# Patient Record
Sex: MALE | Race: WHITE | NOT HISPANIC OR LATINO | Employment: OTHER | URBAN - METROPOLITAN AREA
[De-identification: names, ages, dates, MRNs, and addresses within clinical notes are randomized per-mention and may not be internally consistent; named-entity substitution may affect disease eponyms.]

---

## 2017-02-17 ENCOUNTER — RECORDS - HEALTHEAST (OUTPATIENT)
Dept: LAB | Facility: CLINIC | Age: 62
End: 2017-02-17

## 2017-02-17 LAB
CHOLEST SERPL-MCNC: 269 MG/DL
FASTING STATUS PATIENT QL REPORTED: ABNORMAL
HDLC SERPL-MCNC: 35 MG/DL
LDLC SERPL CALC-MCNC: 162 MG/DL
TRIGL SERPL-MCNC: 359 MG/DL

## 2017-09-01 ENCOUNTER — RECORDS - HEALTHEAST (OUTPATIENT)
Dept: LAB | Facility: CLINIC | Age: 62
End: 2017-09-01

## 2017-09-01 LAB
CHOLEST SERPL-MCNC: 197 MG/DL
FASTING STATUS PATIENT QL REPORTED: ABNORMAL
HDLC SERPL-MCNC: 37 MG/DL
LDLC SERPL CALC-MCNC: 132 MG/DL
TRIGL SERPL-MCNC: 142 MG/DL

## 2018-02-27 ENCOUNTER — RECORDS - HEALTHEAST (OUTPATIENT)
Dept: LAB | Facility: CLINIC | Age: 63
End: 2018-02-27

## 2018-02-27 LAB
ANION GAP SERPL CALCULATED.3IONS-SCNC: 8 MMOL/L (ref 5–18)
BUN SERPL-MCNC: 13 MG/DL (ref 8–22)
CALCIUM SERPL-MCNC: 9.7 MG/DL (ref 8.5–10.5)
CHLORIDE BLD-SCNC: 102 MMOL/L (ref 98–107)
CHOLEST SERPL-MCNC: 196 MG/DL
CO2 SERPL-SCNC: 29 MMOL/L (ref 22–31)
CREAT SERPL-MCNC: 1.31 MG/DL (ref 0.7–1.3)
FASTING STATUS PATIENT QL REPORTED: ABNORMAL
GFR SERPL CREATININE-BSD FRML MDRD: 56 ML/MIN/1.73M2
GLUCOSE BLD-MCNC: 92 MG/DL (ref 70–125)
HDLC SERPL-MCNC: 38 MG/DL
LDLC SERPL CALC-MCNC: 119 MG/DL
POTASSIUM BLD-SCNC: 4.5 MMOL/L (ref 3.5–5)
PSA SERPL-MCNC: 0.6 NG/ML (ref 0–4.5)
SODIUM SERPL-SCNC: 139 MMOL/L (ref 136–145)
TRIGL SERPL-MCNC: 194 MG/DL

## 2018-10-18 ENCOUNTER — RECORDS - HEALTHEAST (OUTPATIENT)
Dept: LAB | Facility: CLINIC | Age: 63
End: 2018-10-18

## 2018-10-18 LAB
ANION GAP SERPL CALCULATED.3IONS-SCNC: 12 MMOL/L (ref 5–18)
BUN SERPL-MCNC: 14 MG/DL (ref 8–22)
CALCIUM SERPL-MCNC: 10.3 MG/DL (ref 8.5–10.5)
CHLORIDE BLD-SCNC: 97 MMOL/L (ref 98–107)
CO2 SERPL-SCNC: 28 MMOL/L (ref 22–31)
CREAT SERPL-MCNC: 1.23 MG/DL (ref 0.7–1.3)
GFR SERPL CREATININE-BSD FRML MDRD: 60 ML/MIN/1.73M2
GLUCOSE BLD-MCNC: 175 MG/DL (ref 70–125)
POTASSIUM BLD-SCNC: 3.7 MMOL/L (ref 3.5–5)
SODIUM SERPL-SCNC: 137 MMOL/L (ref 136–145)

## 2019-05-21 ENCOUNTER — RECORDS - HEALTHEAST (OUTPATIENT)
Dept: LAB | Facility: CLINIC | Age: 64
End: 2019-05-21

## 2019-05-21 LAB
ANION GAP SERPL CALCULATED.3IONS-SCNC: 13 MMOL/L (ref 5–18)
BUN SERPL-MCNC: 16 MG/DL (ref 8–22)
CALCIUM SERPL-MCNC: 10.3 MG/DL (ref 8.5–10.5)
CHLORIDE BLD-SCNC: 97 MMOL/L (ref 98–107)
CHOLEST SERPL-MCNC: 218 MG/DL
CO2 SERPL-SCNC: 28 MMOL/L (ref 22–31)
CREAT SERPL-MCNC: 1.34 MG/DL (ref 0.7–1.3)
FASTING STATUS PATIENT QL REPORTED: ABNORMAL
GFR SERPL CREATININE-BSD FRML MDRD: 54 ML/MIN/1.73M2
GLUCOSE BLD-MCNC: 101 MG/DL (ref 70–125)
HDLC SERPL-MCNC: 37 MG/DL
LDLC SERPL CALC-MCNC: 127 MG/DL
POTASSIUM BLD-SCNC: 3.7 MMOL/L (ref 3.5–5)
SODIUM SERPL-SCNC: 138 MMOL/L (ref 136–145)
TRIGL SERPL-MCNC: 269 MG/DL

## 2019-05-22 LAB — HBA1C MFR BLD: 6.1 % (ref 4.2–6.1)

## 2019-11-01 ENCOUNTER — RECORDS - HEALTHEAST (OUTPATIENT)
Dept: LAB | Facility: CLINIC | Age: 64
End: 2019-11-01

## 2019-11-01 LAB
ANION GAP SERPL CALCULATED.3IONS-SCNC: 13 MMOL/L (ref 5–18)
BUN SERPL-MCNC: 13 MG/DL (ref 8–22)
CALCIUM SERPL-MCNC: 10.3 MG/DL (ref 8.5–10.5)
CHLORIDE BLD-SCNC: 97 MMOL/L (ref 98–107)
CO2 SERPL-SCNC: 26 MMOL/L (ref 22–31)
CREAT SERPL-MCNC: 1.25 MG/DL (ref 0.7–1.3)
GFR SERPL CREATININE-BSD FRML MDRD: 59 ML/MIN/1.73M2
GLUCOSE BLD-MCNC: 100 MG/DL (ref 70–125)
POTASSIUM BLD-SCNC: 3.9 MMOL/L (ref 3.5–5)
SODIUM SERPL-SCNC: 136 MMOL/L (ref 136–145)

## 2020-09-17 ENCOUNTER — RECORDS - HEALTHEAST (OUTPATIENT)
Dept: LAB | Facility: CLINIC | Age: 65
End: 2020-09-17

## 2020-09-17 LAB
ANION GAP SERPL CALCULATED.3IONS-SCNC: 9 MMOL/L (ref 5–18)
BUN SERPL-MCNC: 12 MG/DL (ref 8–22)
CALCIUM SERPL-MCNC: 9.2 MG/DL (ref 8.5–10.5)
CHLORIDE BLD-SCNC: 104 MMOL/L (ref 98–107)
CHOLEST SERPL-MCNC: 232 MG/DL
CO2 SERPL-SCNC: 23 MMOL/L (ref 22–31)
CREAT SERPL-MCNC: 1.11 MG/DL (ref 0.7–1.3)
FASTING STATUS PATIENT QL REPORTED: ABNORMAL
GFR SERPL CREATININE-BSD FRML MDRD: >60 ML/MIN/1.73M2
GLUCOSE BLD-MCNC: 116 MG/DL (ref 70–125)
HDLC SERPL-MCNC: 40 MG/DL
LDLC SERPL CALC-MCNC: 161 MG/DL
POTASSIUM BLD-SCNC: 4 MMOL/L (ref 3.5–5)
PSA SERPL-MCNC: 0.5 NG/ML (ref 0–4.5)
SODIUM SERPL-SCNC: 136 MMOL/L (ref 136–145)
TRIGL SERPL-MCNC: 153 MG/DL

## 2021-04-15 ENCOUNTER — AMBULATORY - HEALTHEAST (OUTPATIENT)
Dept: SCHEDULING | Facility: CLINIC | Age: 66
End: 2021-04-15

## 2021-04-15 DIAGNOSIS — I10 HTN (HYPERTENSION): ICD-10-CM

## 2021-04-15 DIAGNOSIS — E66.01 MORBID OBESITY (H): ICD-10-CM

## 2021-04-26 ENCOUNTER — RECORDS - HEALTHEAST (OUTPATIENT)
Dept: LAB | Facility: CLINIC | Age: 66
End: 2021-04-26

## 2021-04-26 LAB
ANION GAP SERPL CALCULATED.3IONS-SCNC: 9 MMOL/L (ref 5–18)
BUN SERPL-MCNC: 23 MG/DL (ref 8–22)
CALCIUM SERPL-MCNC: 9.6 MG/DL (ref 8.5–10.5)
CHLORIDE BLD-SCNC: 100 MMOL/L (ref 98–107)
CO2 SERPL-SCNC: 32 MMOL/L (ref 22–31)
CREAT SERPL-MCNC: 1.57 MG/DL (ref 0.7–1.3)
GFR SERPL CREATININE-BSD FRML MDRD: 45 ML/MIN/1.73M2
GLUCOSE BLD-MCNC: 116 MG/DL (ref 70–125)
POTASSIUM BLD-SCNC: 4.2 MMOL/L (ref 3.5–5)
PSA SERPL-MCNC: 5.7 NG/ML (ref 0–4.5)
SODIUM SERPL-SCNC: 141 MMOL/L (ref 136–145)

## 2021-04-28 LAB — BACTERIA SPEC CULT: ABNORMAL

## 2021-05-20 ENCOUNTER — RECORDS - HEALTHEAST (OUTPATIENT)
Dept: LAB | Facility: CLINIC | Age: 66
End: 2021-05-20

## 2021-05-20 LAB
ALBUMIN SERPL-MCNC: 4.6 G/DL (ref 3.5–5)
ALP SERPL-CCNC: 79 U/L (ref 45–120)
ALT SERPL W P-5'-P-CCNC: 33 U/L (ref 0–45)
ANION GAP SERPL CALCULATED.3IONS-SCNC: 11 MMOL/L (ref 5–18)
AST SERPL W P-5'-P-CCNC: 40 U/L (ref 0–40)
BILIRUB SERPL-MCNC: 0.6 MG/DL (ref 0–1)
BUN SERPL-MCNC: 24 MG/DL (ref 8–22)
CALCIUM SERPL-MCNC: 10 MG/DL (ref 8.5–10.5)
CHLORIDE BLD-SCNC: 97 MMOL/L (ref 98–107)
CO2 SERPL-SCNC: 31 MMOL/L (ref 22–31)
CREAT SERPL-MCNC: 1.72 MG/DL (ref 0.7–1.3)
GFR SERPL CREATININE-BSD FRML MDRD: 40 ML/MIN/1.73M2
GLUCOSE BLD-MCNC: 101 MG/DL (ref 70–125)
POTASSIUM BLD-SCNC: 4.8 MMOL/L (ref 3.5–5)
PROT SERPL-MCNC: 7.3 G/DL (ref 6–8)
SODIUM SERPL-SCNC: 139 MMOL/L (ref 136–145)

## 2021-05-22 ENCOUNTER — RECORDS - HEALTHEAST (OUTPATIENT)
Dept: LAB | Facility: CLINIC | Age: 66
End: 2021-05-22

## 2021-05-22 LAB
SARS-COV-2 PCR COMMENT: NORMAL
SARS-COV-2 RNA SPEC QL NAA+PROBE: NEGATIVE
SARS-COV-2 VIRUS SPECIMEN SOURCE: NORMAL

## 2021-06-16 ENCOUNTER — RECORDS - HEALTHEAST (OUTPATIENT)
Dept: LAB | Facility: CLINIC | Age: 66
End: 2021-06-16

## 2021-06-16 LAB
ANION GAP SERPL CALCULATED.3IONS-SCNC: 12 MMOL/L (ref 5–18)
BUN SERPL-MCNC: 21 MG/DL (ref 8–22)
CALCIUM SERPL-MCNC: 9.8 MG/DL (ref 8.5–10.5)
CHLORIDE BLD-SCNC: 94 MMOL/L (ref 98–107)
CO2 SERPL-SCNC: 31 MMOL/L (ref 22–31)
CREAT SERPL-MCNC: 1.58 MG/DL (ref 0.7–1.3)
GFR SERPL CREATININE-BSD FRML MDRD: 44 ML/MIN/1.73M2
GLUCOSE BLD-MCNC: 116 MG/DL (ref 70–125)
POTASSIUM BLD-SCNC: 4 MMOL/L (ref 3.5–5)
SODIUM SERPL-SCNC: 137 MMOL/L (ref 136–145)

## 2021-06-27 ENCOUNTER — HOSPITAL ENCOUNTER (OUTPATIENT)
Dept: NUTRITION | Facility: HOSPITAL | Age: 66
Discharge: HOME | End: 2021-06-27
Attending: FAMILY MEDICINE
Payer: MEDICARE

## 2021-06-27 DIAGNOSIS — E66.01 MORBID OBESITY (H): ICD-10-CM

## 2021-06-27 DIAGNOSIS — I10 HYPERTENSION, UNSPECIFIED TYPE: ICD-10-CM

## 2021-07-07 NOTE — PROGRESS NOTES
"Progress Notes by Maryana Roberts RD at 6/21/2021  1:00 PM     Author: Maryana Roberts RD Service: -- Author Type: Registered Dietitian    Filed: 7/12/2021  3:28 PM Date of Service: 6/21/2021  1:00 PM Status: Addendum    : Maryana Roberts RD (Registered Dietitian)    Related Notes: Original Note by Maryana Roberts RD (Registered Dietitian) filed at 6/27/2021 11:16 PM       OP MNT Nutrition Assessment & Education    Derek Harrington is a 66 y.o. male who is being evaluated via a billable telephone visit.    Phone call duration:  60  minutes    Patient seen for outpatient MNT initial assessment.    Referring diagnosis:   hypertension  Morbid obesity (H)    Nutrition Diagnosis:   Obesity related to excess calories as evidenced by BMI >40 m2    Anthropometrics:  Height: 5' 9\"   Weight:  Per patient wt increased from 220 lbs when hospitalized up to current wt 280 lbs  BMI: 41.3  BMI indication: >40 extreme obesity (class 3)  IBW: 160 lbs    Goal weight: 250 lbs  Wt Readings from Last 3 Encounters:   12/21/19 (!) 240 lb (108.9 kg)   11/17/19 (!) 242 lb (109.8 kg)     Labs:   Vit D not listed.   RD recommends supplement averaging 7,500 IU daily (185 mcg)     Supplements:   Vitamin C    Estimated Nutrition Needs:  Assessment weight is 86 kg , adjusted weight    Energy Needs: @ 2150  kcals daily    25 kcals/kg adjusted wt    17 kcals/kg actual weight    Protein Needs: @ 105 g daily    1.2 gm/kg adjusted wt    1.4 gm/kg IBW     Fluid Needs: @ 2800 mls daily, 22 mls/kg actual weight    Assessment of physical activity:   Dumbells and stationary bike  RD recommended Consider \"step\" as indoor activity option       Diet recall: Derek has omitted soda. He now drinks mostly water.   Consumes 10 cups fluid daily. He is consuming 2 meals daily.    Midday: (3-4 carb choices)  2 eggs & Cream of wheat / no sugar ~or~  2 eggs & 3-4 pancakes or [place] toast / no syrup     Dinner: (3-4 carb choices)    Meat/1 cup starch/starchy " vegetable    Snack:   Derek brocks Melon, pineapple, grapes, banana    RD reviewed Limit Carb choices to < 4 per meal  Expect continued weight loss with current carb & calorie intake.    Education materials provided:     100 calorie snack list    Chair exercises    Fast food booklet    1800 calorie meal plan    Calorie controlled B-L-D ideas    Carb list with Portions    Ways to add fruits & vegetables    Info on Vitamin D      Patient had no barriers to learning, verbalized understanding, and compliance is expected.    Phone number provided for questions. Recommended follow-up in as needed.    Thank you for this consult. Call me at [phone] for questions  Maryana Roberts RD    Start time: 1:00  Stop time:  2:00

## 2021-07-12 NOTE — ADDENDUM NOTE
Addendum Note by Maryana Roberts RD at 6/21/2021  1:00 PM     Author: Maryana Roberts RD Service: -- Author Type: Registered Dietitian    Filed: 7/12/2021  3:28 PM Date of Service: 6/21/2021  1:00 PM Status: Signed    : Maryana Roberts RD (Registered Dietitian)    Encounter addended by: Maryana Roberts RD on: 7/12/2021  3:28 PM      Actions taken: Clinical Note Signed

## 2021-09-15 ENCOUNTER — LAB REQUISITION (OUTPATIENT)
Dept: LAB | Facility: CLINIC | Age: 66
End: 2021-09-15
Payer: MEDICARE

## 2021-09-15 DIAGNOSIS — I10 ESSENTIAL (PRIMARY) HYPERTENSION: ICD-10-CM

## 2021-09-15 DIAGNOSIS — E78.5 HYPERLIPIDEMIA, UNSPECIFIED: ICD-10-CM

## 2021-09-15 LAB
ANION GAP SERPL CALCULATED.3IONS-SCNC: 11 MMOL/L (ref 5–18)
BUN SERPL-MCNC: 18 MG/DL (ref 8–22)
CALCIUM SERPL-MCNC: 10.4 MG/DL (ref 8.5–10.5)
CHLORIDE BLD-SCNC: 99 MMOL/L (ref 98–107)
CHOLEST SERPL-MCNC: 197 MG/DL
CO2 SERPL-SCNC: 29 MMOL/L (ref 22–31)
CREAT SERPL-MCNC: 1.34 MG/DL (ref 0.7–1.3)
GFR SERPL CREATININE-BSD FRML MDRD: 56 ML/MIN/1.73M2
GLUCOSE BLD-MCNC: 99 MG/DL (ref 70–125)
HDLC SERPL-MCNC: 43 MG/DL
LDLC SERPL CALC-MCNC: 107 MG/DL
POTASSIUM BLD-SCNC: 4.4 MMOL/L (ref 3.5–5)
SODIUM SERPL-SCNC: 139 MMOL/L (ref 136–145)
TRIGL SERPL-MCNC: 233 MG/DL

## 2021-09-15 PROCEDURE — 80048 BASIC METABOLIC PNL TOTAL CA: CPT | Mod: ORL | Performed by: FAMILY MEDICINE

## 2021-09-15 PROCEDURE — 80061 LIPID PANEL: CPT | Mod: ORL | Performed by: FAMILY MEDICINE

## 2021-12-16 PROBLEM — I10 BENIGN HYPERTENSION: Status: ACTIVE | Noted: 2021-12-16

## 2021-12-16 PROBLEM — R35.1 NOCTURIA: Status: ACTIVE | Noted: 2021-12-16

## 2021-12-16 PROBLEM — E78.5 HYPERLIPIDEMIA: Status: ACTIVE | Noted: 2021-12-16

## 2021-12-16 PROBLEM — J30.2 SEASONAL ALLERGIES: Status: ACTIVE | Noted: 2021-12-16

## 2021-12-16 PROBLEM — A41.9 SEPSIS (H): Status: ACTIVE | Noted: 2020-11-12

## 2021-12-16 PROBLEM — R29.6 FREQUENT FALLS: Status: ACTIVE | Noted: 2020-11-12

## 2021-12-16 PROBLEM — G47.00 INSOMNIA: Status: ACTIVE | Noted: 2021-12-16

## 2021-12-16 PROBLEM — E66.9 OBESITY: Status: ACTIVE | Noted: 2021-12-16

## 2021-12-16 PROBLEM — Z87.828 HISTORY OF SUBDURAL HEMATOMA (POST TRAUMATIC): Status: ACTIVE | Noted: 2021-12-16

## 2021-12-16 PROBLEM — N40.1 LOWER URINARY TRACT SYMPTOMS DUE TO BENIGN PROSTATIC HYPERPLASIA: Status: ACTIVE | Noted: 2021-12-16

## 2021-12-16 PROBLEM — J44.9 CHRONIC OBSTRUCTIVE PULMONARY DISEASE (H): Status: ACTIVE | Noted: 2020-11-12

## 2022-01-04 ENCOUNTER — OFFICE VISIT (OUTPATIENT)
Dept: SURGERY | Facility: CLINIC | Age: 67
End: 2022-01-04
Payer: COMMERCIAL

## 2022-01-04 VITALS
BODY MASS INDEX: 38.75 KG/M2 | HEIGHT: 70 IN | SYSTOLIC BLOOD PRESSURE: 124 MMHG | DIASTOLIC BLOOD PRESSURE: 82 MMHG | WEIGHT: 270.7 LBS

## 2022-01-04 DIAGNOSIS — E78.5 HYPERLIPIDEMIA, UNSPECIFIED HYPERLIPIDEMIA TYPE: ICD-10-CM

## 2022-01-04 DIAGNOSIS — I10 BENIGN HYPERTENSION: ICD-10-CM

## 2022-01-04 DIAGNOSIS — Z86.69 HISTORY OF MIGRAINE HEADACHES: ICD-10-CM

## 2022-01-04 DIAGNOSIS — E66.01 MORBID OBESITY (H): Primary | ICD-10-CM

## 2022-01-04 PROBLEM — N18.31 CHRONIC KIDNEY DISEASE, STAGE 3A (H): Status: ACTIVE | Noted: 2022-01-04

## 2022-01-04 PROCEDURE — 99205 OFFICE O/P NEW HI 60 MIN: CPT | Performed by: FAMILY MEDICINE

## 2022-01-04 ASSESSMENT — MIFFLIN-ST. JEOR: SCORE: 2024.14

## 2022-01-04 NOTE — PROGRESS NOTES
"    New Medical Weight Management Consult    PATIENT:  Derek Harrington  MRN:         [id number]  :         1955  ANNI:         2022      I had the pleasure of seeing Derek Harrington. Full intake/assessment was done to determine barriers to weight loss success and develop a treatment plan. Derek Harrington is a 66 year old male interested in treatment of medical problems associated with excess weight. He has a height of 5' 10\", a weight of 270 lbs 11.2 oz, and the calculated Body mass index is 38.84 kg/m .    ASSESSMENT/PLAN:  1. Morbid obesity (H)  We discussed healthy habits to assist with weight loss. He will work on planning meals ahead of time using the plate method for portion control and macronutrient proportions. He will try keeping a food journal. He would benefit from seeing our dietician but it is likely not covered by insurance. We discussed medication that may assist with weight loss. He may be a candidate for topamax. Unfortunately, he did not bring his medication list in with it. He will call to update this. If it is safe to prescribe with his current meds I will send it to his pharmacy. Risks, benefits and possible side effects discussed.    2. Hyperlipidemia, unspecified hyperlipidemia type  This may improve with healthy habits and weight loss.    3. Benign hypertension  This may improve with healthy habits and weight loss.    He has the following co-morbidities:       2022   I have the following health issues associated with obesity: None of the above   I have the following symptoms associated with obesity: Lower Extremity Swelling, Back Pain, Fatigue       Patient Goals 2022   I am interested in having a healthier weight to diminish current health problems: Yes   I am interested in having a healthier weight in order to prevent future health problems: Yes   I am interested in having a healthier weight in order to have a future surgery: No       Referring Provider 2022   Please " "name the provider who referred you to Medical Weight Management.  If you do not know, please answer: \"I Don't Know\".        Weight History 1/4/2022   How concerned are you about your weight? Very Concerned   Would you describe your weight gain as gradual? Yes   I became overweight: As an Adult   The following factors have contributed to my weight gain:  After Quitting Smoking, Eating Wrong Types of Food, Eating Too Much   I have tried the following methods to lose weight: Watching Portions or Calories, Exercise, Fasting   My lowest weight since age 20 was: 145   My highest weight since age 20 was: 270   The most weight I have ever lost was: (lbs) 32   I have the following family history of obesity/being overweight:  One or more of my siblings are overweight   Has anyone in your family had weight loss surgery? No   How has your weight changed over the last year?  Gained   How many pounds? 30     Patient perceived barriers to weight loss:    Patient states that he had septic pneumonia about a year ago that put him in a coma for about a month. Since then his mobility has been more limited. Also he quit smoking.    Diet Recall Review with Patient 1/4/2022   Do you typically eat breakfast? Yes   If you do eat breakfast, what types of food do you eat? pancakes or [place] toast and eggs (midmorning)   Do you typically eat lunch? No   Do you typically eat supper? Yes   If you do eat supper, what types of food do you typically eat? potatoes,;meat loaf ,tocos and spegetti- rare vegetables   Do you typically eat snacks? Yes- cut back recently- now snacking 3 x per week   If you do snack, what types of food do you typically eat? corncurles and ice cream   Do you like vegetables?  Yes   Do you drink water? Yes- 48 oz per day - forcing it because of his kidneys   How many glasses of juice do you drink in a typical day? 3- cut back recently   How many of glasses of milk do you drink in a typical day? 1   If you do drink " milk, what type? 2%   How many 8oz glasses of sugar containing drinks such as Lex-Aid/sweet tea do you drink in a day? 0   How many cans/bottles of sugar pop/soda/tea/sports drinks do you drink in a day? Some rootbeer lately   How many cans/bottles of diet pop/soda/tea or sports drink do you drink in a day? 0   How often do you have a drink of alcohol? Monthly or Less   If you do drink, how many drinks might you have in a day? 1 or 2       Eating Habits 1/4/2022   Generally, my meals include foods like these: bread, pasta, rice, potatoes, corn, crackers, sweet dessert, pop, or juice. Almost Everyday   Generally, my meals include foods like these: fried meats, brats, burgers, french fries, pizza, cheese, chips, or ice cream. A Few Times a Week   Eat fast food (like [employer], [employer], [employer]aco [employer]). Less Than Weekly   Eat at a buffet or sit-down restaurant. Once a Week   Eat most of my meals in front of the TV or computer. A Few Times a Week   Often skip meals, eat at random times, have no regular eating times. A Few Times a Week   Rarely sit down for a meal but snack or graze throughout.  Never   Eat extra snacks between meals. Once a Week   Eat most of my food at the end of the day. Almost Everyday   Eat in the middle of the night or wake up at night to eat. Never   Eat extra snacks to prevent or correct low blood sugar. Never   Eat to prevent acid reflux or stomach pain. Never   Worry about not having enough food to eat. Never   Have you been to the food shelf at least a few times this year? No   I eat when I am depressed. Once a Week   I eat when I am stressed. Once a Week   I eat when I am bored. Once a Week   I eat when I am anxious. Once a Week   I eat when I am happy or as a reward. Once a Week   I feel hungry all the time even if I just have eaten. Once a Week   Feeling full is important to me. Once a Week   I finish all the food on my plate even if I am already full. Everyday   I can't resist eating  delicious food or walk past the good food/smell. Once a Week   I eat/snack without noticing that I am eating. A Few Times a Week   I eat when I am preparing the meal. Never   I eat more than usual when I see others eating. Never   I think about food all day. A Few Times a Week   Please list any other foods you crave? cheese puffs       Amount of Food 1/4/2022   I make myself vomit what I have eaten or use laxatives to get rid of food. Never   I eat a large amount of food, like a loaf of bread, a box of cookies, a pint/quart of ice cream, all at once. Monthly   I eat a large amount of food even when I am not hungry. Never   I eat rapidly. Never   I eat alone because I feel embarrassed and do not want others to see how much I have eaten. Never   I eat until I am uncomfortably full. Never   I feel bad, disgusted, or guilty after I overeat. Weekly   I make myself vomit what I have eaten or use laxatives to get rid of food. Never       Activity/Exercise History 1/4/2022   How much of a typical 12 hour day do you spend sitting? Most of the Day   How much of a typical 12 hour day do you spend lying down? Less Than Half the Day   How much of a typical day do you spend walking/standing? Less Than Half the Day   How many hours (not including work) do you spend on the TV/Video Games/Computer/Tablet/Phone? 4-5 Hours   How many times a week are you active for the purpose of exercise? 4-5 TImes a Week- exercise bike   What keeps you from being more active? Too tired   How many total minutes do you spend doing some activity for the purpose of exercising when you exercise? More Than 30 Minutes       PAST MEDICAL HISTORY:  Past Medical History:   Diagnosis Date     History of diabetes mellitus     pre-diabetic     Hypertension        Work/Social History Reviewed With Patient 1/4/2022   My employment status is: Retired   What is your marital status? /In a Relationship   If in a relationship, is your significant other  overweight? Yes   Do you have children? Yes   If you have children, are they overweight? No   Who do you live with?  my wife   Are they supportive of your health goals? Yes   Who does the food shopping?  both my wife and I       Mental Health History Reviewed With Patient 1/4/2022   Have you ever been physically or sexually abused? No   Over the past 2 weeks how often have you felt down, depressed, or hopeless? For Several Days       Sleep History Reviewed With Patient 1/4/2022   How many hours do you sleep at night? 6- insomnia, has to get up to urinate   Has anyone seen or heard you stop breathing during your sleep? No   Do you often feel tired, fatigued, or sleepy during the day? Yes   Do you have a TV/Computer in your bedroom? No       MEDICATIONS:   Current Outpatient Medications   Medication Sig Dispense Refill     oxyCODONE (ROXICODONE) 5 MG immediate release tablet [OXYCODONE (ROXICODONE) 5 MG IMMEDIATE RELEASE TABLET] Take 1 tablet (5 mg total) by mouth every 6 (six) hours as needed for pain. 6 tablet 0       ALLERGIES:   No Known Allergies     ROS  General  Fatigue: yes  HEENT  Hx of glaucoma: no  Vision changes: no  Cardiovascular  Hx of heart disease: no  Chest Pain with Exertion: occasional tightness in his L chest  Palpitations: no  Pulmonary  Shortness of breath at rest: yes  Shortness of breath with exertion: yes  Stop-bang score: 6  [place] Score: 6  Gastrointestinal  Heartburn: yes- take Rolaids occasionally  Psychiatric  Moods Stable: yes  Endocrine  Polydipsia: no  No personal or family history of medullary thyroid cancer: no  Neurologic  Hx of seizures: no  Migraine headaches: history of    PHYSICAL EXAM:  Physical Exam:    GEN: Alert and oriented in no acute distress.   HEENT: mucous membranes moist  CV: RRR no MRG  ABDOMEN: moderate protuberance  SKIN: positive acanthosis nigricans    FOLLOW-UP:   In 2 months with myself    Total time spent on the date of this encounter doing: chart review,  review of test results, patient visit, physical exam, education, counseling, developing plan of care and documenting = 68 minutes.  Sincerely,    DANE Arceo MD

## 2022-01-04 NOTE — LETTER
"    2022         RE: Derek Harrington  [address] [place] [address]        Dear Colleague,    Thank you for referring your patient, Derek Harrington, to the [facility] [place]. Please see a copy of my visit note below.        New Medical Weight Management Consult    PATIENT:  Derek Harrington  MRN:         [id number]  :         1955  ANNI:         2022      I had the pleasure of seeing Derek Harrington. Full intake/assessment was done to determine barriers to weight loss success and develop a treatment plan. Derek Harrington is a 66 year old male interested in treatment of medical problems associated with excess weight. He has a height of 5' 10\", a weight of 270 lbs 11.2 oz, and the calculated Body mass index is 38.84 kg/m .    ASSESSMENT/PLAN:  1. Morbid obesity (H)  We discussed healthy habits to assist with weight loss. He will work on planning meals ahead of time using the plate method for portion control and macronutrient proportions. He will try keeping a food journal. He would benefit from seeing our dietician but it is likely not covered by insurance. We discussed medication that may assist with weight loss. He may be a candidate for topamax. Unfortunately, he did not bring his medication list in with it. He will call to update this. If it is safe to prescribe with his current meds I will send it to his pharmacy. Risks, benefits and possible side effects discussed.    2. Hyperlipidemia, unspecified hyperlipidemia type  This may improve with healthy habits and weight loss.    3. Benign hypertension  This may improve with healthy habits and weight loss.    He has the following co-morbidities:       2022   I have the following health issues associated with obesity: None of the above   I have the following symptoms associated with obesity: Lower Extremity Swelling, Back Pain, Fatigue       Patient Goals 2022   I am interested in having a healthier " "weight to diminish current health problems: Yes   I am interested in having a healthier weight in order to prevent future health problems: Yes   I am interested in having a healthier weight in order to have a future surgery: No       Referring Provider 1/4/2022   Please name the provider who referred you to Medical Weight Management.  If you do not know, please answer: \"I Don't Know\".        Weight History 1/4/2022   How concerned are you about your weight? Very Concerned   Would you describe your weight gain as gradual? Yes   I became overweight: As an Adult   The following factors have contributed to my weight gain:  After Quitting Smoking, Eating Wrong Types of Food, Eating Too Much   I have tried the following methods to lose weight: Watching Portions or Calories, Exercise, Fasting   My lowest weight since age 20 was: 145   My highest weight since age 20 was: 270   The most weight I have ever lost was: (lbs) 32   I have the following family history of obesity/being overweight:  One or more of my siblings are overweight   Has anyone in your family had weight loss surgery? No   How has your weight changed over the last year?  Gained   How many pounds? 30     Patient perceived barriers to weight loss:    Patient states that he had septic pneumonia about a year ago that put him in a coma for about a month. Since then his mobility has been more limited. Also he quit smoking.    Diet Recall Review with Patient 1/4/2022   Do you typically eat breakfast? Yes   If you do eat breakfast, what types of food do you eat? pancakes or [place] toast and eggs (midmorning)   Do you typically eat lunch? No   Do you typically eat supper? Yes   If you do eat supper, what types of food do you typically eat? potatoes,;meat loaf ,tocos and spegetti- rare vegetables   Do you typically eat snacks? Yes- cut back recently- now snacking 3 x per week   If you do snack, what types of food do you typically eat? corncurles and ice cream "   Do you like vegetables?  Yes   Do you drink water? Yes- 48 oz per day - forcing it because of his kidneys   How many glasses of juice do you drink in a typical day? 3- cut back recently   How many of glasses of milk do you drink in a typical day? 1   If you do drink milk, what type? 2%   How many 8oz glasses of sugar containing drinks such as Lex-Aid/sweet tea do you drink in a day? 0   How many cans/bottles of sugar pop/soda/tea/sports drinks do you drink in a day? Some rootbeer lately   How many cans/bottles of diet pop/soda/tea or sports drink do you drink in a day? 0   How often do you have a drink of alcohol? Monthly or Less   If you do drink, how many drinks might you have in a day? 1 or 2       Eating Habits 1/4/2022   Generally, my meals include foods like these: bread, pasta, rice, potatoes, corn, crackers, sweet dessert, pop, or juice. Almost Everyday   Generally, my meals include foods like these: fried meats, brats, burgers, french fries, pizza, cheese, chips, or ice cream. A Few Times a Week   Eat fast food (like [employer], [employer], [employer]aco Bell). Less Than Weekly   Eat at a buffet or sit-down restaurant. Once a Week   Eat most of my meals in front of the TV or computer. A Few Times a Week   Often skip meals, eat at random times, have no regular eating times. A Few Times a Week   Rarely sit down for a meal but snack or graze throughout.  Never   Eat extra snacks between meals. Once a Week   Eat most of my food at the end of the day. Almost Everyday   Eat in the middle of the night or wake up at night to eat. Never   Eat extra snacks to prevent or correct low blood sugar. Never   Eat to prevent acid reflux or stomach pain. Never   Worry about not having enough food to eat. Never   Have you been to the food shelf at least a few times this year? No   I eat when I am depressed. Once a Week   I eat when I am stressed. Once a Week   I eat when I am bored. Once a Week   I eat when I am anxious. Once a Week    I eat when I am happy or as a reward. Once a Week   I feel hungry all the time even if I just have eaten. Once a Week   Feeling full is important to me. Once a Week   I finish all the food on my plate even if I am already full. Everyday   I can't resist eating delicious food or walk past the good food/smell. Once a Week   I eat/snack without noticing that I am eating. A Few Times a Week   I eat when I am preparing the meal. Never   I eat more than usual when I see others eating. Never   I think about food all day. A Few Times a Week   Please list any other foods you crave? cheese puffs       Amount of Food 1/4/2022   I make myself vomit what I have eaten or use laxatives to get rid of food. Never   I eat a large amount of food, like a loaf of bread, a box of cookies, a pint/quart of ice cream, all at once. Monthly   I eat a large amount of food even when I am not hungry. Never   I eat rapidly. Never   I eat alone because I feel embarrassed and do not want others to see how much I have eaten. Never   I eat until I am uncomfortably full. Never   I feel bad, disgusted, or guilty after I overeat. Weekly   I make myself vomit what I have eaten or use laxatives to get rid of food. Never       Activity/Exercise History 1/4/2022   How much of a typical 12 hour day do you spend sitting? Most of the Day   How much of a typical 12 hour day do you spend lying down? Less Than Half the Day   How much of a typical day do you spend walking/standing? Less Than Half the Day   How many hours (not including work) do you spend on the TV/Video Games/Computer/Tablet/Phone? 4-5 Hours   How many times a week are you active for the purpose of exercise? 4-5 TImes a Week- exercise bike   What keeps you from being more active? Too tired   How many total minutes do you spend doing some activity for the purpose of exercising when you exercise? More Than 30 Minutes       PAST MEDICAL HISTORY:  Past Medical History:   Diagnosis Date     History  of diabetes mellitus     pre-diabetic     Hypertension        Work/Social History Reviewed With Patient 1/4/2022   My employment status is: Retired   What is your marital status? /In a Relationship   If in a relationship, is your significant other overweight? Yes   Do you have children? Yes   If you have children, are they overweight? No   Who do you live with?  my wife   Are they supportive of your health goals? Yes   Who does the food shopping?  both my wife and I       Mental Health History Reviewed With Patient 1/4/2022   Have you ever been physically or sexually abused? No   Over the past 2 weeks how often have you felt down, depressed, or hopeless? For Several Days       Sleep History Reviewed With Patient 1/4/2022   How many hours do you sleep at night? 6- insomnia, has to get up to urinate   Has anyone seen or heard you stop breathing during your sleep? No   Do you often feel tired, fatigued, or sleepy during the day? Yes   Do you have a TV/Computer in your bedroom? No       MEDICATIONS:   Current Outpatient Medications   Medication Sig Dispense Refill     oxyCODONE (ROXICODONE) 5 MG immediate release tablet [OXYCODONE (ROXICODONE) 5 MG IMMEDIATE RELEASE TABLET] Take 1 tablet (5 mg total) by mouth every 6 (six) hours as needed for pain. 6 tablet 0       ALLERGIES:   No Known Allergies     ROS  General  Fatigue: yes  HEENT  Hx of glaucoma: no  Vision changes: no  Cardiovascular  Hx of heart disease: no  Chest Pain with Exertion: occasional tightness in his L chest  Palpitations: no  Pulmonary  Shortness of breath at rest: yes  Shortness of breath with exertion: yes  Stop-bang score: 6  [place] Score: 6  Gastrointestinal  Heartburn: yes- take Rolaids occasionally  Psychiatric  Moods Stable: yes  Endocrine  Polydipsia: no  No personal or family history of medullary thyroid cancer: no  Neurologic  Hx of seizures: no  Migraine headaches: history of    PHYSICAL EXAM:  Physical Exam:    GEN: Alert and  oriented in no acute distress.   HEENT: mucous membranes moist  CV: RRR no MRG  ABDOMEN: moderate protuberance  SKIN: positive acanthosis nigricans    FOLLOW-UP:   In 2 months with myself    Total time spent on the date of this encounter doing: chart review, review of test results, patient visit, physical exam, education, counseling, developing plan of care and documenting = 68 minutes.  Sincerely,    DANE Arceo MD          Again, thank you for allowing me to participate in the care of your patient.        Sincerely,        DANE Arceo MD

## 2022-01-04 NOTE — PATIENT INSTRUCTIONS

## 2022-01-11 ENCOUNTER — TELEPHONE (OUTPATIENT)
Dept: SURGERY | Facility: CLINIC | Age: 67
End: 2022-01-11

## 2022-01-11 DIAGNOSIS — E66.01 CLASS 2 SEVERE OBESITY DUE TO EXCESS CALORIES WITH SERIOUS COMORBIDITY AND BODY MASS INDEX (BMI) OF 38.0 TO 38.9 IN ADULT (H): Primary | ICD-10-CM

## 2022-01-11 DIAGNOSIS — E66.812 CLASS 2 SEVERE OBESITY DUE TO EXCESS CALORIES WITH SERIOUS COMORBIDITY AND BODY MASS INDEX (BMI) OF 38.0 TO 38.9 IN ADULT (H): Primary | ICD-10-CM

## 2022-01-11 RX ORDER — ATORVASTATIN CALCIUM 40 MG/1
1 TABLET, FILM COATED ORAL DAILY
COMMUNITY

## 2022-01-11 RX ORDER — DOXAZOSIN 8 MG/1
8 TABLET ORAL AT BEDTIME
COMMUNITY

## 2022-01-11 RX ORDER — TOPIRAMATE 50 MG/1
TABLET, FILM COATED ORAL
Qty: 90 TABLET | Refills: 1 | Status: SHIPPED | OUTPATIENT
Start: 2022-01-11 | End: 2022-07-26

## 2022-01-11 RX ORDER — CHLORTHALIDONE 25 MG/1
1 TABLET ORAL DAILY
COMMUNITY

## 2022-01-11 RX ORDER — MULTIVIT WITH MINERALS/LUTEIN
1 TABLET ORAL DAILY
COMMUNITY

## 2022-01-11 RX ORDER — CETIRIZINE HYDROCHLORIDE 10 MG/1
1 TABLET ORAL PRN
COMMUNITY
End: 2022-09-20

## 2022-01-11 RX ORDER — ALBUTEROL SULFATE 90 UG/1
2 AEROSOL, METERED RESPIRATORY (INHALATION) PRN
COMMUNITY
Start: 2021-04-14 | End: 2022-06-14

## 2022-01-11 RX ORDER — LISINOPRIL 10 MG/1
1 TABLET ORAL DAILY
COMMUNITY

## 2022-01-11 RX ORDER — CHLORAL HYDRATE 500 MG
2 CAPSULE ORAL DAILY
COMMUNITY

## 2022-01-11 NOTE — TELEPHONE ENCOUNTER
Called pt and went over his medications. I let him know that I will pass the info on to  and he verbalized understanding.    Sarah Joel RN, CBN  [facility]  P [phone]  F [phone]

## 2022-01-11 NOTE — TELEPHONE ENCOUNTER
Reason for call:  Other   Patient called regarding (reason for call): call back  Additional comments: pt states he was told to call in to nurse to let them know which meds he is currently on so the Dr can prescribe weight loss meds    Phone number to reach patient:  Home number on file [phone] (home)    Best Time:      Can we leave a detailed message on this number?  YES    Travel screening: Not Applicable

## 2022-01-12 NOTE — TELEPHONE ENCOUNTER
Called pt to let him know and he said he forgot to tell me one medication that he takes which is Melatonin. I added it to his medication list and will let  know. Pt verbalized understanding.    Sarah Joel RN, CBN  [facility]  P [phone]  F [phone]

## 2022-02-04 ENCOUNTER — LAB REQUISITION (OUTPATIENT)
Dept: LAB | Facility: CLINIC | Age: 67
End: 2022-02-04
Payer: COMMERCIAL

## 2022-02-04 DIAGNOSIS — E83.52 HYPERCALCEMIA: ICD-10-CM

## 2022-02-04 PROCEDURE — 80048 BASIC METABOLIC PNL TOTAL CA: CPT | Mod: ORL | Performed by: FAMILY MEDICINE

## 2022-02-05 LAB
ANION GAP SERPL CALCULATED.3IONS-SCNC: 13 MMOL/L (ref 5–18)
BUN SERPL-MCNC: 31 MG/DL (ref 8–22)
CALCIUM SERPL-MCNC: 10 MG/DL (ref 8.5–10.5)
CHLORIDE BLD-SCNC: 96 MMOL/L (ref 98–107)
CO2 SERPL-SCNC: 29 MMOL/L (ref 22–31)
CREAT SERPL-MCNC: 1.48 MG/DL (ref 0.7–1.3)
GFR SERPL CREATININE-BSD FRML MDRD: 52 ML/MIN/1.73M2
GLUCOSE BLD-MCNC: 158 MG/DL (ref 70–125)
POTASSIUM BLD-SCNC: 3.8 MMOL/L (ref 3.5–5)
SODIUM SERPL-SCNC: 138 MMOL/L (ref 136–145)

## 2022-02-11 ENCOUNTER — LAB REQUISITION (OUTPATIENT)
Dept: LAB | Facility: CLINIC | Age: 67
End: 2022-02-11
Payer: COMMERCIAL

## 2022-02-11 DIAGNOSIS — E83.52 HYPERCALCEMIA: ICD-10-CM

## 2022-02-11 DIAGNOSIS — E78.5 HYPERLIPIDEMIA, UNSPECIFIED: ICD-10-CM

## 2022-02-15 LAB
CALCIUM, IONIZED MEASURED: 1.24 MMOL/L (ref 1.11–1.3)
CHOLEST SERPL-MCNC: 199 MG/DL
FASTING STATUS PATIENT QL REPORTED: NO
HDLC SERPL-MCNC: 43 MG/DL
ION CA PH 7.4: 1.23 MMOL/L (ref 1.11–1.3)
LDLC SERPL CALC-MCNC: 85 MG/DL
PH: 7.38 (ref 7.35–7.45)
TRIGL SERPL-MCNC: 354 MG/DL

## 2022-02-15 PROCEDURE — 80061 LIPID PANEL: CPT | Mod: ORL | Performed by: FAMILY MEDICINE

## 2022-02-15 PROCEDURE — 82330 ASSAY OF CALCIUM: CPT | Mod: ORL | Performed by: FAMILY MEDICINE

## 2022-06-13 NOTE — PATIENT INSTRUCTIONS
Here is the weight loss surgery informational session: [email or web address]           Eat Better ? Move More ? Live Well    Eat 3 nutrient-rich meals each day    Don t skip meals--it will cause you to overeat later in the day!    Eating fiber (vegetables/fruits/whole grains) and protein with meals helps you stay full longer    Choose foods with less than 10 grams of sugar and 5 grams of fat per serving to prevent excess calories and weight re-gain  Eat around the same times each day to develop a routine eating schedule   Avoid snacking unless physically hungry.   Planned snacks: 1-2 times per day and no more than 150 calories    Eat protein first   Protein helps with healing, maintaining adequate muscle mass, reducing hunger and optimizing nutritional status   Aim for 60-80 grams of protein per day   Fill up on Fiber   Fiber comes from plants--fruits, veggies, whole grains, nuts/seeds and beans   Fiber is low in calories, high in phytonutrients and helps you stay full longer   Aim for 25-35 grams per day by eating fiber with meals and snacks  Eat [id number]   Take 20-30 minutes to eat each meal by taking small bites, chewing foods to applesauce consistency or 20-30 times before you swallow   Eating foods too fast can delay satiety/fullness signals and increase overeating   Slow down your eating by using toddler utensils, putting your fork/spoon down between bites and not watching TV or emailing during meals!   Keep a Journal         Writing down what you eat, how you feel and when you are active helps you identify new changes to work on from week to week         Look for ways to cut 100 calories from your current diet 2-3 times per day  Drink 64 ounces of 0-Calorie drinks between meals   Water   Zero calorie Propel  or Vitamin Water     [employer] Lif[employer]  Zero Calories   Crystal Light , Sugar-Free Lex-Aid , and other sugar-free lemonade or flavored brown   Keep Caffeine to less than 300mg per day ie: 3-6oz  cups coffee     Work up to 45-60 minutes of physical activity most days of the week   Helps with losing weight and prevent regaining those extra pounds!    Do a combo of cardio (walking/water exercises) and strength training (lifting weights/Vinyasa yoga)    Avoid Mindless Eating   Be present when you eat--take note of the smell, taste and quality of your food   Make a list of alternative activities you could do to prevent eating out of boredom/stress  Go for a walk, call a friend, chew gum, paint your nails, re-organize the garage, etc

## 2022-06-13 NOTE — PROGRESS NOTES
Bariatric Care Clinic Non Surgical Follow up Visit   Date of visit: 6/14/2022  Physician: DANE Arceo MD, MD  Primary Care is Antonio Lujan.  Derek Harrington   67 year old  male     Initial Weight: 270#  Initial BMI: 38.84  Today's Weight:   Wt Readings from Last 1 Encounters:   06/14/22 127.2 kg (280 lb 8 oz)     Body mass index is 40.25 kg/m .           Assessment and Plan   Assessment: Derek is a 67 year old year old male who presents for medical weight management.      Plan:    1. Morbid obesity (H)  Patient was congratulated on his success thus far. Healthy habits to assist with further weight loss were discussed. He will work on cutting down on portion sizes and decreasing his snacking. I referred him to PT for deconditioning so that he can safely start exercising. He will stop the topamax as it doesn't seem to help him and he didn't want to raise the dose.. We discussed the patient's co-morbid conditions including hypertension and hyperlipidemia. These likely will improve with healthy habits and weight loss.    2. Benign hypertension  This may improve with healthy habits and weight loss.    3. History of migraine headaches  This may improve with healthy habits and weight loss.    Follow up in 1 month with our dietician and in 3 months with myself           INTERIM HISTORY  Patient is taking the topamax and does not think it is helping to control his appetite. He has been lost to follow up since January. He is accompanied by his wife who states that he eats large portions.    DIETARY HISTORY  Meals Per Day: 2  Eating Protein First?: no  Food Diary: B:eggs and pancakes  ( eats late) L:often skips D:gravy and potatoes, meatloaf, potatoes almost every dinner, lots of cheese  Snacks Per Day: desert  Typical Snack: pudding, ice cream, jello, cheese puffs  Fluid Intake: 48 oz per day  Portion Control: no  Calorie Containing Beverages: cut back, soda if he goes out, occasionally has it at  "home  Typical Protein Food Choices: eggs, meat  Choosing Whole Grains: not typically  Meals at Restaurant per week:0-1    Positive Changes Since Last Visit: cut back on eating out and soda  Struggling With: large portions, vegetable intake    Knowledgeable in Reading Food Labels: no  Getting Adequate Protein: no      PHYSICAL ACTIVITY PATTERNS:  Exercise bike for 6 minutes then some other exercises. He does use the wheel chair around the house.     REVIEW OF SYSTEMS  GENERAL/CONSTITUTIONAL:  Fatigue: sometimes  HEENT:  Vision changes, glaucoma: no  CARDIOVASCULAR:  Chest Pain with Exertion: no  PULMONARY:  Dyspnea on exertion: yes  NEUROLOGIC:  Paresthesias: no  PSYCHIATRIC:  Moods: stable  ENDOCRINE:  Monitoring Blood Sugars: na  Sugars Well Controlled: na  No personal or family history of medullary thyroid cancer not discussed  :  Birth control: na       Patient Profile   Social History     Social History Narrative     Not on file        Past Medical History   Past Medical History:   Diagnosis Date     History of diabetes mellitus     pre-diabetic     Hypertension      Patient Active Problem List   Diagnosis     Benign hypertension     Chronic obstructive pulmonary disease (H)     Frequent falls     History of subdural hematoma (post traumatic)     Hyperlipidemia     Insomnia     Lower urinary tract symptoms due to benign prostatic hyperplasia     Nocturia     Obesity     Seasonal allergies     Sepsis (H)     Chronic kidney disease, stage 3a (H)     Morbid obesity (H)       Past Surgical History  He has no past surgical history on file.     Examination   /82   Ht 1.778 m (5' 10\")   Wt 127.2 kg (280 lb 8 oz)   BMI 40.25 kg/m    General:  Alert and ambulatory, NAD  Pscyh/Mood: stable, tangential speech         Counseling:   We reviewed the important post op bariatric recommendations:  -eating 3 meals daily  -eating protein first, getting >60gm protein daily  -eating slowly, chewing food " well  -avoiding/limiting calorie containing beverages  -limiting starchy vegetables and carbohydrates, choosing wheat, not white with breads,   crackers, pastas, óscar, bagels, tortillas, rice  -limiting restaurant or cafeteria eating to twice a week or less    We discussed the importance of restorative sleep and stress management in maintaining a healthy weight.  We discussed the National Weight Control Registry healthy weight maintenance strategies and ways to optimize metabolism.  We discussed the importance of physical activity including cardiovascular and strength training in maintaining a healthier weight.    Total time spent on the date of this encounter doing: chart review, review of test results, patient visit, physical exam, education, counseling, developing plan of care and documenting = 42 minutes.         DANE Arceo MD  [facility]

## 2022-06-14 ENCOUNTER — OFFICE VISIT (OUTPATIENT)
Dept: SURGERY | Facility: CLINIC | Age: 67
End: 2022-06-14
Payer: COMMERCIAL

## 2022-06-14 VITALS
DIASTOLIC BLOOD PRESSURE: 82 MMHG | BODY MASS INDEX: 40.16 KG/M2 | HEIGHT: 70 IN | WEIGHT: 280.5 LBS | SYSTOLIC BLOOD PRESSURE: 130 MMHG

## 2022-06-14 DIAGNOSIS — Z86.69 HISTORY OF MIGRAINE HEADACHES: ICD-10-CM

## 2022-06-14 DIAGNOSIS — I10 BENIGN HYPERTENSION: ICD-10-CM

## 2022-06-14 DIAGNOSIS — R53.81 PHYSICAL DECONDITIONING: ICD-10-CM

## 2022-06-14 DIAGNOSIS — E66.01 MORBID OBESITY (H): Primary | ICD-10-CM

## 2022-06-14 DIAGNOSIS — R26.81 GAIT INSTABILITY: ICD-10-CM

## 2022-06-14 PROCEDURE — 99215 OFFICE O/P EST HI 40 MIN: CPT | Performed by: FAMILY MEDICINE

## 2022-06-14 NOTE — LETTER
6/14/2022         RE: Derek Harrington  [address] [place] [address]        Dear Colleague,    Thank you for referring your patient, Derek Harrington, to the [facility] [place]. Please see a copy of my visit note below.      Bariatric Care Clinic Non Surgical Follow up Visit   Date of visit: 6/14/2022  Physician: DANE Arceo MD, MD  Primary Care is Antonio Lujan.  Derek Harrington   67 year old  male     Initial Weight: 270#  Initial BMI: 38.84  Today's Weight:   Wt Readings from Last 1 Encounters:   06/14/22 127.2 kg (280 lb 8 oz)     Body mass index is 40.25 kg/m .           Assessment and Plan   Assessment: Derek is a 67 year old year old male who presents for medical weight management.      Plan:    1. Morbid obesity (H)  Patient was congratulated on his success thus far. Healthy habits to assist with further weight loss were discussed. He will work on cutting down on portion sizes and decreasing his snacking. I referred him to PT for deconditioning so that he can safely start exercising. He will stop the topamax as it doesn't seem to help him and he didn't want to raise the dose.. We discussed the patient's co-morbid conditions including hypertension and hyperlipidemia. These likely will improve with healthy habits and weight loss.    2. Benign hypertension  This may improve with healthy habits and weight loss.    3. History of migraine headaches  This may improve with healthy habits and weight loss.    Follow up in 1 month with our dietician and in 3 months with myself           INTERIM HISTORY  Patient is taking the topamax and does not think it is helping to control his appetite. He has been lost to follow up since January. He is accompanied by his wife who states that he eats large portions.    DIETARY HISTORY  Meals Per Day: 2  Eating Protein First?: no  Food Diary: B:eggs and pancakes  ( eats late) L:often skips D:gravy and potatoes,  "meatloaf, potatoes almost every dinner, lots of cheese  Snacks Per Day: desert  Typical Snack: pudding, ice cream, jello, cheese puffs  Fluid Intake: 48 oz per day  Portion Control: no  Calorie Containing Beverages: cut back, soda if he goes out, occasionally has it at home  Typical Protein Food Choices: eggs, meat  Choosing Whole Grains: not typically  Meals at Restaurant per week:0-1    Positive Changes Since Last Visit: cut back on eating out and soda  Struggling With: large portions, vegetable intake    Knowledgeable in Reading Food Labels: no  Getting Adequate Protein: no      PHYSICAL ACTIVITY PATTERNS:  Exercise bike for 6 minutes then some other exercises. He does use the wheel chair around the house.     REVIEW OF SYSTEMS  GENERAL/CONSTITUTIONAL:  Fatigue: sometimes  HEENT:  Vision changes, glaucoma: no  CARDIOVASCULAR:  Chest Pain with Exertion: no  PULMONARY:  Dyspnea on exertion: yes  NEUROLOGIC:  Paresthesias: no  PSYCHIATRIC:  Moods: stable  ENDOCRINE:  Monitoring Blood Sugars: na  Sugars Well Controlled: na  No personal or family history of medullary thyroid cancer not discussed  :  Birth control: na       Patient Profile   Social History     Social History Narrative     Not on file        Past Medical History   Past Medical History:   Diagnosis Date     History of diabetes mellitus     pre-diabetic     Hypertension      Patient Active Problem List   Diagnosis     Benign hypertension     Chronic obstructive pulmonary disease (H)     Frequent falls     History of subdural hematoma (post traumatic)     Hyperlipidemia     Insomnia     Lower urinary tract symptoms due to benign prostatic hyperplasia     Nocturia     Obesity     Seasonal allergies     Sepsis (H)     Chronic kidney disease, stage 3a (H)     Morbid obesity (H)       Past Surgical History  He has no past surgical history on file.     Examination   /82   Ht 1.778 m (5' 10\")   Wt 127.2 kg (280 lb 8 oz)   BMI 40.25 kg/m    General:  " Alert and ambulatory, NAD  Pscyh/Mood: stable, tangential speech         Counseling:   We reviewed the important post op bariatric recommendations:  -eating 3 meals daily  -eating protein first, getting >60gm protein daily  -eating slowly, chewing food well  -avoiding/limiting calorie containing beverages  -limiting starchy vegetables and carbohydrates, choosing wheat, not white with breads,   crackers, pastas, óscar, bagels, tortillas, rice  -limiting restaurant or cafeteria eating to twice a week or less    We discussed the importance of restorative sleep and stress management in maintaining a healthy weight.  We discussed the National Weight Control Registry healthy weight maintenance strategies and ways to optimize metabolism.  We discussed the importance of physical activity including cardiovascular and strength training in maintaining a healthier weight.    Total time spent on the date of this encounter doing: chart review, review of test results, patient visit, physical exam, education, counseling, developing plan of care and documenting = 42 minutes.         DANE Arceo MD  [facility]             Again, thank you for allowing me to participate in the care of your patient.        Sincerely,        DANE Arceo MD

## 2022-07-25 DIAGNOSIS — E66.812 CLASS 2 SEVERE OBESITY DUE TO EXCESS CALORIES WITH SERIOUS COMORBIDITY AND BODY MASS INDEX (BMI) OF 38.0 TO 38.9 IN ADULT (H): ICD-10-CM

## 2022-07-25 DIAGNOSIS — E66.01 CLASS 2 SEVERE OBESITY DUE TO EXCESS CALORIES WITH SERIOUS COMORBIDITY AND BODY MASS INDEX (BMI) OF 38.0 TO 38.9 IN ADULT (H): ICD-10-CM

## 2022-07-26 RX ORDER — TOPIRAMATE 50 MG/1
TABLET, FILM COATED ORAL
Qty: 90 TABLET | Refills: 0 | Status: SHIPPED | OUTPATIENT
Start: 2022-07-26 | End: 2022-10-24

## 2022-07-26 RX ORDER — TOPIRAMATE 50 MG/1
TABLET, FILM COATED ORAL
Qty: 90 TABLET | Refills: 1 | Status: SHIPPED | OUTPATIENT
Start: 2022-07-26 | End: 2022-12-19

## 2022-09-06 NOTE — PROGRESS NOTES
Bariatric Care Clinic Non Surgical Follow up Visit   Date of visit: 9/20/2022  Physician: DANE Arceo MD, MD  Primary Care is Antonio Lujan.  Derek Harrington   67 year old  male     Initial Weight: 270#  Initial BMI: 38.84  Today's Weight:   Wt Readings from Last 1 Encounters:   09/20/22 127.9 kg (282 lb)     Body mass index is 40.46 kg/m .           Assessment and Plan   Assessment: Derek is a 67 year old year old male who presents for medical weight management.      Plan:    1. Morbid obesity (H)  Patient was congratulated on his success thus far. Healthy habits to assist with further weight loss were discussed. He will continue to work on portion control and decreasing snacking. He will try to work in more vegetables. He will work on planning meals ahead of time using the plate method for portion control and macronutrient proportions. He will try keeping a food journal using myfitness pal. We discussed the use of phentermine but he declined as he didn't want to cut back on his coffee. We discussed the patient's co-morbid conditions including hypertension and migraine headaches. These likely will improve with healthy habits and weight loss.    2. Benign hypertension  This may improve with healthy habits and weight loss.    3. History of migraine headaches  This may improve with healthy habits and weight loss.    Follow up in 3 months with myself           INTERIM HISTORY  Patient stopped his topamax after his last visit in June because it didn't seem to be helping him. He was referred to PT for deconditioning but he didn't go because he is not sure it is covered by insurance.           DIETARY HISTORY  Meals Per Day: 2  Eating Protein First?: yes  Food Diary: B: skips L:eggs (3) sometimes with pancakes (3) or hamburgers or sloppy Joes D:fish or meat and sometimes vegetables and potatoes  Snacks Per Day: cut back  Typical Snack: peanut butter cups, oatmeal cream cookies, cheese puffs  Fluid Intake:  64  Portion Control: struggling  Calorie Containing Beverages: cut back on soda but still drinking it  Meals at Restaurant per week:0-1    Positive Changes Since Last Visit: decreased cheese and butter, working on portion control, decreased snack  Struggling With: large portions, exercise, soda    Knowledgeable in Reading Food Labels: no  Getting Adequate Protein: yes  Sleeping 7-8 hours/day sometimes      PHYSICAL ACTIVITY PATTERNS:  Exercise bike 6 minutes per day, some walking up and down the hallway 3 times, PT exercise    REVIEW OF SYSTEMS  GENERAL/CONSTITUTIONAL:  Fatigue: yes  HEENT:  Vision changes, glaucoma: no  CARDIOVASCULAR:  Chest Pain with Exertion: no  PULMONARY:  Dyspnea on exertion: no  NEUROLOGIC:  Paresthesias: no  PSYCHIATRIC:  Moods: stable  MUSCULOSKELETAL/RHEUMATOLOGIC  Arthralgias: no  Myalgias: no  Neuropathy in his legs  ENDOCRINE:  Monitoring Blood Sugars: no  Sugars Well Controlled: na  No personal or family history of medullary thyroid cancer no  :  Birth control: na       Patient Profile   Social History     Social History Narrative     Not on file        Past Medical History   Past Medical History:   Diagnosis Date     History of diabetes mellitus     pre-diabetic     Hypertension      Patient Active Problem List   Diagnosis     Benign hypertension     Chronic obstructive pulmonary disease (H)     Frequent falls     History of subdural hematoma (post traumatic)     Hyperlipidemia     Insomnia     Lower urinary tract symptoms due to benign prostatic hyperplasia     Nocturia     Obesity     Seasonal allergies     Sepsis (H)     Chronic kidney disease, stage 3a (H)     Morbid obesity (H)       Past Surgical History  He has no past surgical history on file.     Examination   Wt 127.9 kg (282 lb)   BMI 40.46 kg/m    General:  Alert and ambulatory, NAD  Pscyh/Mood: stable         Counseling:   We reviewed the important post op bariatric recommendations:  -eating 3 meals daily  -eating  protein first, getting >60gm protein daily  -eating slowly, chewing food well  -avoiding/limiting calorie containing beverages  -limiting starchy vegetables and carbohydrates, choosing wheat, not white with breads,   crackers, pastas, óscar, bagels, tortillas, rice  -limiting restaurant or cafeteria eating to twice a week or less    We discussed the importance of restorative sleep and stress management in maintaining a healthy weight.  We discussed the National Weight Control Registry healthy weight maintenance strategies and ways to optimize metabolism.  We discussed the importance of physical activity including cardiovascular and strength training in maintaining a healthier weight.    Total time spent on the date of this encounter doing: chart review, review of test results, patient visit, physical exam, education, counseling, developing plan of care and documenting = 42 minutes.         DANE Arceo MD  [facility]

## 2022-09-20 ENCOUNTER — OFFICE VISIT (OUTPATIENT)
Dept: SURGERY | Facility: CLINIC | Age: 67
End: 2022-09-20
Payer: COMMERCIAL

## 2022-09-20 VITALS
DIASTOLIC BLOOD PRESSURE: 82 MMHG | SYSTOLIC BLOOD PRESSURE: 132 MMHG | WEIGHT: 282 LBS | BODY MASS INDEX: 40.37 KG/M2 | HEIGHT: 70 IN

## 2022-09-20 DIAGNOSIS — Z86.69 HISTORY OF MIGRAINE HEADACHES: ICD-10-CM

## 2022-09-20 DIAGNOSIS — E66.01 MORBID OBESITY (H): Primary | ICD-10-CM

## 2022-09-20 DIAGNOSIS — I10 BENIGN HYPERTENSION: ICD-10-CM

## 2022-09-20 PROCEDURE — 99215 OFFICE O/P EST HI 40 MIN: CPT | Mod: 95 | Performed by: FAMILY MEDICINE

## 2022-09-20 RX ORDER — TOPIRAMATE 50 MG/1
1 CAPSULE, EXTENDED RELEASE ORAL EVERY 24 HOURS
COMMUNITY
Start: 2022-07-11 | End: 2022-09-20

## 2022-09-20 RX ORDER — MIRABEGRON 50 MG/1
50 TABLET, FILM COATED, EXTENDED RELEASE ORAL DAILY
COMMUNITY
Start: 2021-10-13

## 2022-09-20 NOTE — PATIENT INSTRUCTIONS

## 2022-09-20 NOTE — LETTER
9/20/2022         RE: Derek Harrington  [address] [place] [address]        Dear Colleague,    Thank you for referring your patient, Derek Harrington, to the [facility] [place]. Please see a copy of my visit note below.    Bariatric Care Clinic Non Surgical Follow up Visit   Date of visit: 9/20/2022  Physician: DANE Arceo MD, MD  Primary Care is Antonio Lujan.  Derek Harrington   67 year old  male     Initial Weight: 270#  Initial BMI: 38.84  Today's Weight:   Wt Readings from Last 1 Encounters:   09/20/22 127.9 kg (282 lb)     Body mass index is 40.46 kg/m .           Assessment and Plan   Assessment: Derek is a 67 year old year old male who presents for medical weight management.      Plan:    1. Morbid obesity (H)  Patient was congratulated on his success thus far. Healthy habits to assist with further weight loss were discussed. He will continue to work on portion control and decreasing snacking. He will try to work in more vegetables. He will work on planning meals ahead of time using the plate method for portion control and macronutrient proportions. He will try keeping a food journal using myfitness pal. We discussed the use of phentermine but he declined as he didn't want to cut back on his coffee. We discussed the patient's co-morbid conditions including hypertension and migraine headaches. These likely will improve with healthy habits and weight loss.    2. Benign hypertension  This may improve with healthy habits and weight loss.    3. History of migraine headaches  This may improve with healthy habits and weight loss.    Follow up in 3 months with myself           INTERIM HISTORY  Patient stopped his topamax after his last visit in June because it didn't seem to be helping him. He was referred to PT for deconditioning but he didn't go because he is not sure it is covered by insurance.           DIETARY HISTORY  Meals Per Day: 2  Eating  Protein First?: yes  Food Diary: B: skips L:eggs (3) sometimes with pancakes (3) or hamburgers or sloppy Joes D:fish or meat and sometimes vegetables and potatoes  Snacks Per Day: cut back  Typical Snack: peanut butter cups, oatmeal cream cookies, cheese puffs  Fluid Intake: 64  Portion Control: struggling  Calorie Containing Beverages: cut back on soda but still drinking it  Meals at Restaurant per week:0-1    Positive Changes Since Last Visit: decreased cheese and butter, working on portion control, decreased snack  Struggling With: large portions, exercise, soda    Knowledgeable in Reading Food Labels: no  Getting Adequate Protein: yes  Sleeping 7-8 hours/day sometimes      PHYSICAL ACTIVITY PATTERNS:  Exercise bike 6 minutes per day, some walking up and down the hallway 3 times, PT exercise    REVIEW OF SYSTEMS  GENERAL/CONSTITUTIONAL:  Fatigue: yes  HEENT:  Vision changes, glaucoma: no  CARDIOVASCULAR:  Chest Pain with Exertion: no  PULMONARY:  Dyspnea on exertion: no  NEUROLOGIC:  Paresthesias: no  PSYCHIATRIC:  Moods: stable  MUSCULOSKELETAL/RHEUMATOLOGIC  Arthralgias: no  Myalgias: no  Neuropathy in his legs  ENDOCRINE:  Monitoring Blood Sugars: no  Sugars Well Controlled: na  No personal or family history of medullary thyroid cancer no  :  Birth control: na       Patient Profile   Social History     Social History Narrative     Not on file        Past Medical History   Past Medical History:   Diagnosis Date     History of diabetes mellitus     pre-diabetic     Hypertension      Patient Active Problem List   Diagnosis     Benign hypertension     Chronic obstructive pulmonary disease (H)     Frequent falls     History of subdural hematoma (post traumatic)     Hyperlipidemia     Insomnia     Lower urinary tract symptoms due to benign prostatic hyperplasia     Nocturia     Obesity     Seasonal allergies     Sepsis (H)     Chronic kidney disease, stage 3a (H)     Morbid obesity (H)       Past Surgical  History  He has no past surgical history on file.     Examination   Wt 127.9 kg (282 lb)   BMI 40.46 kg/m    General:  Alert and ambulatory, NAD  Pscyh/Mood: stable         Counseling:   We reviewed the important post op bariatric recommendations:  -eating 3 meals daily  -eating protein first, getting >60gm protein daily  -eating slowly, chewing food well  -avoiding/limiting calorie containing beverages  -limiting starchy vegetables and carbohydrates, choosing wheat, not white with breads,   crackers, pastas, óscar, bagels, tortillas, rice  -limiting restaurant or cafeteria eating to twice a week or less    We discussed the importance of restorative sleep and stress management in maintaining a healthy weight.  We discussed the National Weight Control Registry healthy weight maintenance strategies and ways to optimize metabolism.  We discussed the importance of physical activity including cardiovascular and strength training in maintaining a healthier weight.    Total time spent on the date of this encounter doing: chart review, review of test results, patient visit, physical exam, education, counseling, developing plan of care and documenting = 42 minutes.         DANE Arceo MD  [facility]             Again, thank you for allowing me to participate in the care of your patient.        Sincerely,        DANE Arceo MD

## 2022-10-24 ENCOUNTER — LAB REQUISITION (OUTPATIENT)
Dept: LAB | Facility: CLINIC | Age: 67
End: 2022-10-24

## 2022-10-24 DIAGNOSIS — E66.812 CLASS 2 SEVERE OBESITY DUE TO EXCESS CALORIES WITH SERIOUS COMORBIDITY AND BODY MASS INDEX (BMI) OF 38.0 TO 38.9 IN ADULT (H): ICD-10-CM

## 2022-10-24 DIAGNOSIS — E66.01 CLASS 2 SEVERE OBESITY DUE TO EXCESS CALORIES WITH SERIOUS COMORBIDITY AND BODY MASS INDEX (BMI) OF 38.0 TO 38.9 IN ADULT (H): ICD-10-CM

## 2022-10-24 DIAGNOSIS — E78.5 HYPERLIPIDEMIA, UNSPECIFIED: ICD-10-CM

## 2022-10-24 DIAGNOSIS — I10 ESSENTIAL (PRIMARY) HYPERTENSION: ICD-10-CM

## 2022-10-24 DIAGNOSIS — N40.1 BENIGN PROSTATIC HYPERPLASIA WITH LOWER URINARY TRACT SYMPTOMS: ICD-10-CM

## 2022-10-24 LAB
ANION GAP SERPL CALCULATED.3IONS-SCNC: 13 MMOL/L (ref 7–15)
BUN SERPL-MCNC: 24.6 MG/DL (ref 8–23)
CALCIUM SERPL-MCNC: 10.2 MG/DL (ref 8.8–10.2)
CHLORIDE SERPL-SCNC: 96 MMOL/L (ref 98–107)
CHOLEST SERPL-MCNC: 200 MG/DL
CREAT SERPL-MCNC: 1.35 MG/DL (ref 0.67–1.17)
DEPRECATED HCO3 PLAS-SCNC: 27 MMOL/L (ref 22–29)
GFR SERPL CREATININE-BSD FRML MDRD: 58 ML/MIN/1.73M2
GLUCOSE SERPL-MCNC: 184 MG/DL (ref 70–99)
HDLC SERPL-MCNC: 46 MG/DL
LDLC SERPL CALC-MCNC: 102 MG/DL
NONHDLC SERPL-MCNC: 154 MG/DL
POTASSIUM SERPL-SCNC: 3.7 MMOL/L (ref 3.4–5.3)
PSA SERPL-MCNC: 0.41 NG/ML (ref 0–4.5)
SODIUM SERPL-SCNC: 136 MMOL/L (ref 136–145)
TRIGL SERPL-MCNC: 260 MG/DL

## 2022-10-24 PROCEDURE — 80048 BASIC METABOLIC PNL TOTAL CA: CPT | Performed by: FAMILY MEDICINE

## 2022-10-24 PROCEDURE — 80061 LIPID PANEL: CPT | Performed by: FAMILY MEDICINE

## 2022-10-24 PROCEDURE — G0103 PSA SCREENING: HCPCS | Performed by: FAMILY MEDICINE

## 2022-10-24 RX ORDER — TOPIRAMATE 50 MG/1
TABLET, FILM COATED ORAL
Qty: 90 TABLET | Refills: 0 | Status: SHIPPED | OUTPATIENT
Start: 2022-10-24 | End: 2023-06-05

## 2022-12-19 PROBLEM — D12.4 BENIGN NEOPLASM OF DESCENDING COLON: Status: ACTIVE | Noted: 2022-07-13

## 2022-12-27 ENCOUNTER — LAB REQUISITION (OUTPATIENT)
Dept: LAB | Facility: CLINIC | Age: 67
End: 2022-12-27

## 2022-12-27 DIAGNOSIS — N18.31 CHRONIC KIDNEY DISEASE, STAGE 3A (H): ICD-10-CM

## 2022-12-27 LAB
ANION GAP SERPL CALCULATED.3IONS-SCNC: 14 MMOL/L (ref 7–15)
BUN SERPL-MCNC: 25.6 MG/DL (ref 8–23)
CALCIUM SERPL-MCNC: 9.9 MG/DL (ref 8.8–10.2)
CHLORIDE SERPL-SCNC: 106 MMOL/L (ref 98–107)
CREAT SERPL-MCNC: 1.24 MG/DL (ref 0.67–1.17)
DEPRECATED HCO3 PLAS-SCNC: 22 MMOL/L (ref 22–29)
GFR SERPL CREATININE-BSD FRML MDRD: 65 ML/MIN/1.73M2
GLUCOSE SERPL-MCNC: 120 MG/DL (ref 70–99)
POTASSIUM SERPL-SCNC: 3.9 MMOL/L (ref 3.4–5.3)
SODIUM SERPL-SCNC: 142 MMOL/L (ref 136–145)
TSH SERPL DL<=0.005 MIU/L-ACNC: 20.6 UIU/ML (ref 0.3–4.2)

## 2022-12-27 PROCEDURE — 80048 BASIC METABOLIC PNL TOTAL CA: CPT | Performed by: FAMILY MEDICINE

## 2022-12-27 PROCEDURE — 84443 ASSAY THYROID STIM HORMONE: CPT | Performed by: FAMILY MEDICINE

## 2023-01-27 ENCOUNTER — LAB REQUISITION (OUTPATIENT)
Dept: LAB | Facility: CLINIC | Age: 68
End: 2023-01-27

## 2023-01-27 DIAGNOSIS — E03.9 HYPOTHYROIDISM, UNSPECIFIED: ICD-10-CM

## 2023-01-27 LAB — TSH SERPL DL<=0.005 MIU/L-ACNC: 0.79 UIU/ML (ref 0.3–4.2)

## 2023-01-27 PROCEDURE — 84443 ASSAY THYROID STIM HORMONE: CPT | Performed by: FAMILY MEDICINE

## 2023-03-26 ENCOUNTER — APPOINTMENT (OUTPATIENT)
Dept: RADIOLOGY | Facility: HOSPITAL | Age: 68
End: 2023-03-26
Attending: EMERGENCY MEDICINE
Payer: COMMERCIAL

## 2023-03-26 ENCOUNTER — HOSPITAL ENCOUNTER (EMERGENCY)
Facility: HOSPITAL | Age: 68
Discharge: HOME | End: 2023-03-26
Attending: EMERGENCY MEDICINE | Admitting: EMERGENCY MEDICINE
Payer: COMMERCIAL

## 2023-03-26 ENCOUNTER — APPOINTMENT (OUTPATIENT)
Dept: CT IMAGING | Facility: HOSPITAL | Age: 68
End: 2023-03-26
Attending: EMERGENCY MEDICINE
Payer: COMMERCIAL

## 2023-03-26 VITALS
TEMPERATURE: 97.6 F | RESPIRATION RATE: 20 BRPM | DIASTOLIC BLOOD PRESSURE: 69 MMHG | SYSTOLIC BLOOD PRESSURE: 136 MMHG | HEART RATE: 92 BPM | WEIGHT: 267 LBS | BODY MASS INDEX: 38.31 KG/M2 | OXYGEN SATURATION: 96 %

## 2023-03-26 DIAGNOSIS — S00.81XA ABRASION OF FACE, INITIAL ENCOUNTER: ICD-10-CM

## 2023-03-26 DIAGNOSIS — S01.21XA LACERATION OF NOSE, INITIAL ENCOUNTER: ICD-10-CM
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
## 2023-03-26 LAB
ANION GAP SERPL CALCULATED.3IONS-SCNC: 14 MMOL/L (ref 7–15)
APTT PPP: 25 SECONDS (ref 22–38)
BUN SERPL-MCNC: 30.8 MG/DL (ref 8–23)
CALCIUM SERPL-MCNC: 10.4 MG/DL (ref 8.8–10.2)
CHLORIDE SERPL-SCNC: 101 MMOL/L (ref 98–107)
CREAT SERPL-MCNC: 1.13 MG/DL (ref 0.67–1.17)
DEPRECATED HCO3 PLAS-SCNC: 27 MMOL/L (ref 22–29)
ERYTHROCYTE [DISTWIDTH] IN BLOOD BY AUTOMATED COUNT: 13.4 % (ref 10–15)
ETHANOL SERPL-MCNC: <0.01 G/DL
GFR SERPL CREATININE-BSD FRML MDRD: 72 ML/MIN/1.73M2
GLUCOSE BLDC GLUCOMTR-MCNC: 109 MG/DL (ref 70–99)
GLUCOSE BLDC GLUCOMTR-MCNC: 112 MG/DL (ref 70–99)
GLUCOSE SERPL-MCNC: 107 MG/DL (ref 70–99)
HCT VFR BLD AUTO: 45.1 % (ref 40–53)
HGB BLD-MCNC: 15.3 G/DL (ref 13.3–17.7)
INR PPP: 1.03 (ref 0.85–1.15)
MCH RBC QN AUTO: 28.7 PG (ref 26.5–33)
MCHC RBC AUTO-ENTMCNC: 33.9 G/DL (ref 31.5–36.5)
MCV RBC AUTO: 85 FL (ref 78–100)
PLATELET # BLD AUTO: 329 10E3/UL (ref 150–450)
POTASSIUM SERPL-SCNC: 3.5 MMOL/L (ref 3.4–5.3)
RBC # BLD AUTO: 5.34 10E6/UL (ref 4.4–5.9)
SODIUM SERPL-SCNC: 142 MMOL/L (ref 136–145)
WBC # BLD AUTO: 12 10E3/UL (ref 4–11)

## 2023-03-26 PROCEDURE — 36415 COLL VENOUS BLD VENIPUNCTURE: CPT | Performed by: EMERGENCY MEDICINE

## 2023-03-26 PROCEDURE — 70450 CT HEAD/BRAIN W/O DYE: CPT

## 2023-03-26 PROCEDURE — 82962 GLUCOSE BLOOD TEST: CPT

## 2023-03-26 PROCEDURE — 85610 PROTHROMBIN TIME: CPT | Performed by: EMERGENCY MEDICINE

## 2023-03-26 PROCEDURE — 80048 BASIC METABOLIC PNL TOTAL CA: CPT | Performed by: EMERGENCY MEDICINE

## 2023-03-26 PROCEDURE — 70486 CT MAXILLOFACIAL W/O DYE: CPT

## 2023-03-26 PROCEDURE — 72125 CT NECK SPINE W/O DYE: CPT

## 2023-03-26 PROCEDURE — 250N000011 HC RX IP 250 OP 636: Performed by: EMERGENCY MEDICINE

## 2023-03-26 PROCEDURE — 90715 TDAP VACCINE 7 YRS/> IM: CPT | Performed by: EMERGENCY MEDICINE

## 2023-03-26 PROCEDURE — 71046 X-RAY EXAM CHEST 2 VIEWS: CPT

## 2023-03-26 PROCEDURE — 90471 IMMUNIZATION ADMIN: CPT | Performed by: EMERGENCY MEDICINE

## 2023-03-26 PROCEDURE — 85018 HEMOGLOBIN: CPT | Performed by: EMERGENCY MEDICINE

## 2023-03-26 PROCEDURE — 99285 EMERGENCY DEPT VISIT HI MDM: CPT | Mod: 25

## 2023-03-26 PROCEDURE — 250N000009 HC RX 250: Performed by: EMERGENCY MEDICINE

## 2023-03-26 PROCEDURE — 85730 THROMBOPLASTIN TIME PARTIAL: CPT | Performed by: EMERGENCY MEDICINE

## 2023-03-26 PROCEDURE — 271N000002 HC RX 271: Performed by: EMERGENCY MEDICINE

## 2023-03-26 PROCEDURE — 12011 RPR F/E/E/N/L/M 2.5 CM/<: CPT

## 2023-03-26 PROCEDURE — 82077 ASSAY SPEC XCP UR&BREATH IA: CPT | Performed by: EMERGENCY MEDICINE

## 2023-03-26 RX ORDER — METHYLCELLULOSE 4000CPS 30 %
POWDER (GRAM) MISCELLANEOUS
Status: COMPLETED | OUTPATIENT
Start: 2023-03-26 | End: 2023-03-26

## 2023-03-26 RX ADMIN — EPINEPHRINE BITARTRATE 3 ML: 1 POWDER at 20:58

## 2023-03-26 RX ADMIN — Medication 150 MG: at 20:58

## 2023-03-26 RX ADMIN — CLOSTRIDIUM TETANI TOXOID ANTIGEN (FORMALDEHYDE INACTIVATED), CORYNEBACTERIUM DIPHTHERIAE TOXOID ANTIGEN (FORMALDEHYDE INACTIVATED), BORDETELLA PERTUSSIS TOXOID ANTIGEN (GLUTARALDEHYDE INACTIVATED), BORDETELLA PERTUSSIS FILAMENTOUS HEMAGGLUTININ ANTIGEN (FORMALDEHYDE INACTIVATED), BORDETELLA PERTUSSIS PERTACTIN ANTIGEN, AND BORDETELLA PERTUSSIS FIMBRIAE 2/3 ANTIGEN 0.5 ML: 5; 2; 2.5; 5; 3; 5 INJECTION, SUSPENSION INTRAMUSCULAR at 19:35

## 2023-03-26 ASSESSMENT — ACTIVITIES OF DAILY LIVING (ADL)
ADLS_ACUITY_SCORE: 35
ADLS_ACUITY_SCORE: 35

## 2023-03-26 NOTE — ED PROVIDER NOTES
"  Emergency Department Encounter     Evaluation Date & Time:   3/26/2023  6:17 PM    CHIEF COMPLAINT:  Fall and Facial Laceration      Triage Note:  Pt brought by [facility] after pt fell outside [facility].  Pts legs gave out when walking and fell face first.  Laceration to bridge of nose, hematoma to left eye, abrasions to face.  No loc, no thinners.  Pt mostly worried about his [employer] that he bought.       Triage Assessment     Row Name 23 [id number]       Triage Assessment (Adult)    Airway WDL WDL       Respiratory WDL    Respiratory WDL WDL       Skin Circulation/Temperature WDL    Skin Circulation/Temperature WDL X;all    Skin Circulation --  laceration and swelling to face    Skin Temperature neutral       Cardiac WDL    Cardiac WDL WDL       Peripheral/Neurovascular WDL    Peripheral Neurovascular WDL WDL       Cognitive/Neuro/Behavioral WDL    Cognitive/Neuro/Behavioral WDL WDL                    Impression and Plan       FINAL IMPRESSION:    ICD-10-CM    1. Fall, initial encounter  W19.XXXA       2. Abrasion of face, initial encounter  S00.81XA       3. Laceration of nose, initial encounter  S01.21XA             ED COURSE & MEDICAL DECISION MAKIN:33 PM Met with and introduced myself to the patient. Disccused history and plan of care.  9:40 PM Performed laceration repair on patient's nose.    68 year old male, history of DM2, HTN, HLD, CKD and COPD, who presents for evaluation after a mechanical fall that occurred just prior to arrival. He was at [facility] to get his wife cold medicine when his legs \"gave out on him\" causing him to fall hitting his face on the pavement. He denies LOC, syncope, headache, N/V, neck pain and extremity weakness / paresthesias. He is not anticoagulated.    On exam, he has a large abrasion with surrounding hematoma to the left forehead.  Abrasions and 2 superficial lacerations to his nose.  Left infraorbital ecchymosis. EOMI without entrapment. Neuro exam is " normal.    Head CT performed and, within limitations of motion degradation, there is no acute intracranial process.  There is a forehead soft tissue hematoma with intact calvarium.    Facial bones CT also performed and demonstrated a soft tissue hematoma and laceration overlying the nasal bones with no nasal bone fracture.    Cervical spine CT negative for fracture and posttraumatic subluxation.  Cervical spine then clinically cleared.    Chest is atraumatic and patient has clear and symmetrical breath sounds, however on presentation his O2 sats were recorded as 89% for which CXR was performed and demonstrated clear lungs bilaterally.    Lacerations repaired with glue (see procedure note).  Tetanus was updated (last dose 8/2013).    Nothing in history or on exam to suggest significant abdominal, pelvis, back or extremity injuries and I do not think emergent imaging studies to evaluate for such are indicated.    Labs remarkable for leukocytosis (WBC 12.0) with no anemia (Hb 15.3).  He has no significant electrolyte derangements or renal impairment.  Coags WNL.  Blood alcohol level negative.    Patient's wife presented to the ED and he was discharged to home with her.  Patient ambulatory without difficulty and tolerating po.    Patient advised to follow-up with his Primary Care Provider this week for a recheck.  Return precautions provided.  Patient stable throughout ED course.      Medical Decision Making    History:    Supplemental history from: Documented in chart, if applicable    External Record(s) reviewed: Documented in chart, if applicable.    Work Up:    Chart documentation includes differential considered and any EKGs or imaging independently interpreted by provider, where specified.    In additional to work up documented, I considered the following work up: Documented in chart, if applicable. SEE ABOVE    Complicating factors:    Care impacted by chronic illness: Chronic Lung Disease, Diabetes and  Hypertension    Care affected by social determinants of health: N/A    Disposition considerations: Discharge. No recommendations on prescription strength medication(s). I considered admission, but ultimately discharged patient Reassuring evaluation.    At the conclusion of the encounter I discussed the results of all the tests and the disposition. The questions were answered. The patient and family acknowledged understanding and were agreeable with the care plan.      MEDICATIONS GIVEN IN THE EMERGENCY DEPARTMENT:  Medications   Tdap (tetanus-diphtheria-acell pertussis) (ADACEL) injection 0.5 mL (0.5 mLs Intramuscular $Given 3/26/23 1935)   lidocaine/EPINEPHrine/tetracaine (LET) solution KIT 3 mL (3 mLs Topical $Given 3/26/23 2058)   methylcellulose powder (150 mg Topical $Given 3/26/23 2058)       NEW PRESCRIPTIONS STARTED AT TODAY'S ED VISIT:  New Prescriptions    No medications on file       HPI     HPI     Derek Harrington is a 68 year old male, history of DM2, HTN, HLD, CKD and COPD, who presents to this ED via EMS for evaluation after a fall.    Per chart review, the patient's last Tetanus was August of 2013.    The patient reports that just prior to ED arrival, while outside of [employer], his knees gave out on him causing him to fall. He fell forward hitting his head / face on the parking lot pavement sustaining abrasions / lacerations to his face. He denies LOC. He reports pain about the forehead abrasion, but otherwise denies headache, nausea / vomiting.  He is not anticoagulated.  He denies neck pain and extremity weakness / paresthesias.      He also denies syncope, chest pain and shortness of breath.  No abdominal pain. He reports chronic back pain that is at baseline.    He does mention that 2.5 years ago he had septic pneumonia and was in a coma for one month. No other medical problems noted.       REVIEW OF SYSTEMS:  All other systems reviewed and are negative.      Medical History     Past Medical  History:   Diagnosis Date     History of diabetes mellitus      Hypertension        History reviewed. No pertinent surgical history.    History reviewed. No pertinent family history.    Social History     Tobacco Use     Smoking status: Former     Types: Cigarettes     Quit date: 2019     Years since quittin.2     Smokeless tobacco: Never   Substance Use Topics     Alcohol use: Not Currently     Comment: very rare occurance        albuterol (PROAIR HFA/PROVENTIL HFA/VENTOLIN HFA) 108 (90 Base) MCG/ACT inhaler  amitriptyline (ELAVIL) 25 MG tablet  atorvastatin (LIPITOR) 40 MG tablet  chlorthalidone (HYGROTON) 25 MG tablet  doxazosin (CARDURA) 8 MG tablet  fish oil-omega-3 fatty acids 1000 MG capsule  lisinopril (ZESTRIL) 10 MG tablet  melatonin 5 MG CAPS  Melatonin-Pyridoxine 5-1 MG TABS  multivitamin (CENTRUM SILVER) tablet  MYRBETRIQ 25 MG 24 hr tablet  MYRBETRIQ 50 MG 24 hr tablet  Spacer/Aero-Holding Chambers (OPTICHAMBER JASON-LG MASK) DONY  SPIRIVA HANDIHALER 18 MCG inhaled capsule  topiramate (TOPAMAX) 50 MG tablet  Vitamin D3 (CHOLECALCIFEROL) 125 MCG (5000 UT) tablet        Physical Exam     First Vitals:  Patient Vitals for the past 24 hrs:   BP Temp Temp src Pulse Resp SpO2 Weight   23 127/74 -- -- -- -- -- --   23 105/66 -- -- 95 -- 93 % --   23 (!) 141/70 -- -- -- -- -- --   23 (!) 145/86 -- -- -- -- -- --   23 129/71 -- -- -- -- -- --   23 (!) 180/100 -- -- 96 -- 98 % --   23 128/75 -- -- 98 -- 95 % --   23 130/66 -- -- 99 -- 93 % --   23 184 (!) 164/85 -- -- 100 -- 96 % --   23 1830 136/68 -- -- 101 -- 93 % --   23 1825 -- 97.8  F (36.6  C) Oral -- -- -- --   23 181 128/76 -- -- 99 20 (!) 89 % 121.1 kg (267 lb)       PHYSICAL EXAM:   Physical Exam    GENERAL: Awake, alert.  In no acute distress.   HEENT: Normocephalic.  Abrasion with surrounding hematoma to the left forehead.   Abrasions and 2 superficial lacerations to his nose.  Left infraorbital ecchymosis.  Pupils equal, round and reactive. Conjunctiva normal. EOMI without entrapment.  NECK: Cervical collar in place.  No midline tenderness to palpation of cervical spine.  After imaging, he has no neck pain with full ROM.  No stridor.  PULMONARY: Chest is atraumatic and nontender to palpation.  No palpable subcutaneous emphysema.  Symmetrical breath sounds without distress.  Lungs clear to auscultation bilaterally without wheezes, rhonchi or rales.  CARDIO: Borderline tachycardic rate with regular rhythm.  No significant murmur, rub or gallop.  Radial pulses strong and symmetrical.  ABDOMINAL: Abdomen atraumatic, soft, non-distended and non-tender to palpation.  No CVAT, BL.  BACK: Back is atraumatic.  No midline tenderness to palpation of thoracic and lumbar spines.  No palpable bony step-offs.  EXTREMITIES: Superficial abrasion to the thenar eminence.  No bony tenderness to palpation of either wrist.  No lower extremity swelling or edema.      NEURO: GCS 15.  Alert and oriented to person, place and time.  Cranial nerves III-XII intact.  Strength 5/5 BL upper and lower extremities with sensation to light touch grossly intact.   PSYCH: Normal mood and affect.  SKIN: Abrasions, lacerations and ecchymosis, as noted above.    Results     LAB:  All pertinent labs reviewed and interpreted  Labs Ordered and Resulted from Time of ED Arrival to Time of ED Departure   CBC WITH PLATELETS - Abnormal       Result Value    WBC Count 12.0 (*)     RBC Count 5.34      Hemoglobin 15.3      Hematocrit 45.1      MCV 85      MCH 28.7      MCHC 33.9      RDW 13.4      Platelet Count 329     BASIC METABOLIC PANEL - Abnormal    Sodium 142      Potassium 3.5      Chloride 101      Carbon Dioxide (CO2) 27      Anion Gap 14      Urea Nitrogen 30.8 (*)     Creatinine 1.13      Calcium 10.4 (*)     Glucose 107 (*)     GFR Estimate 72     GLUCOSE BY METER -  Abnormal    GLUCOSE BY METER POCT 112 (*)    GLUCOSE BY METER - Abnormal    GLUCOSE BY METER POCT 109 (*)    INR - Normal    INR 1.03     PARTIAL THROMBOPLASTIN TIME - Normal    aPTT 25     ETHYL ALCOHOL LEVEL - Normal    Alcohol ethyl <0.01     GLUCOSE MONITOR NURSING POCT       RADIOLOGY:  Chest XR,  PA & LAT   Final Result   IMPRESSION: Heart size is normal. Lungs are clear bilaterally. Stable mild prominence of the superior mediastinum, likely tortuous vascularity. Mediastinum and visualized bony structures are otherwise unremarkable.      Cervical spine CT w/o contrast   Final Result   IMPRESSION:   HEAD CT:   1.  Evaluation is limited by motion degradation.   2.  No acute intracranial process allowing for motion degradation.   3.  Forehead soft tissue hematoma. Calvarium appears intact.      FACIAL BONE CT:   1.  Soft tissue hematoma and laceration overlying the nasal bones. No nasal bone fracture is evident.      CERVICAL SPINE CT:   1.  No fracture or posttraumatic subluxation.   2.  Severe foraminal stenoses as above.      CT Facial Bones without Contrast   Final Result   IMPRESSION:   HEAD CT:   1.  Evaluation is limited by motion degradation.   2.  No acute intracranial process allowing for motion degradation.   3.  Forehead soft tissue hematoma. Calvarium appears intact.      FACIAL BONE CT:   1.  Soft tissue hematoma and laceration overlying the nasal bones. No nasal bone fracture is evident.      CERVICAL SPINE CT:   1.  No fracture or posttraumatic subluxation.   2.  Severe foraminal stenoses as above.      Head CT w/o contrast   Final Result   IMPRESSION:   HEAD CT:   1.  Evaluation is limited by motion degradation.   2.  No acute intracranial process allowing for motion degradation.   3.  Forehead soft tissue hematoma. Calvarium appears intact.      FACIAL BONE CT:   1.  Soft tissue hematoma and laceration overlying the nasal bones. No nasal bone fracture is evident.      CERVICAL SPINE CT:   1.  No  fracture or posttraumatic subluxation.   2.  Severe foraminal stenoses as above.            PROCEDURES:  Procedures:    PROCEDURE: Laceration Repair   INDICATIONS: Laceration   PROCEDURE PROVIDER: Dr Fanny Montelongo   SITE: Nose   TYPE/SIZE: simple, superficial, clean and no foreign body visualized  Two lacerations, each ~0.5 mm (total length)   FUNCTIONAL ASSESSMENT: Distal sensation, circulation and motor intact   MEDICATION: LET   PREPARATION: soaking with Normal saline   DEBRIDEMENT: no debridement   CLOSURE:  Superficial layer closed with Dermabond (medical glue)    Total number of sutures/staples placed: 0       I, Yajaira Quan, am serving as a scribe to document services personally performed by Fanny Montelongo MD based on my observation and the provider's statements to me. I, Fanny Montelongo MD attest that Yajaira Quan is acting in a scribe capacity, has observed my performance of the services and has documented them in accordance with my direction.    Fanny Montelongo MD  Emergency Medicine  [facility] EMERGENCY DEPARTMENT         Fanny Montelongo MD  03/27/23 1600

## 2023-03-26 NOTE — ED TRIAGE NOTES
Pt brought by [facility] EMS after pt fell outside [employer].  Pts legs gave out when walking and fell face first.  Laceration to bridge of nose, hematoma to left eye, abrasions to face.  No loc, no thinners.  Pt mostly worried about his [employer] that he bought.       Triage Assessment     Row Name 03/26/23 3170       Triage Assessment (Adult)    Airway WDL WDL       Respiratory WDL    Respiratory WDL WDL       Skin Circulation/Temperature WDL    Skin Circulation/Temperature WDL X;all    Skin Circulation --  laceration and swelling to face    Skin Temperature neutral       Cardiac WDL    Cardiac WDL WDL       Peripheral/Neurovascular WDL    Peripheral Neurovascular WDL WDL       Cognitive/Neuro/Behavioral WDL    Cognitive/Neuro/Behavioral WDL WDL

## 2023-03-27 NOTE — DISCHARGE INSTRUCTIONS
Please follow-up with your Primary Care Provider this week for a recheck.    Return to the ER for sudden onset or severe headache, focal neurologic deficit (for example, facial droop or right arm weakness), excessive sleepiness, confusion, persistent nausea / vomiting, signs of wound infection (pain, swelling, pus draining from the wounds, redness), fever or other concerns.     Do not get the glue wet for 48 hours and do not apply ointments / lotions, antibacterial creams as these will all cause the glue to breakdown faster than desired.

## 2023-04-14 DIAGNOSIS — E66.01 CLASS 2 SEVERE OBESITY DUE TO EXCESS CALORIES WITH SERIOUS COMORBIDITY AND BODY MASS INDEX (BMI) OF 38.0 TO 38.9 IN ADULT (H): ICD-10-CM

## 2023-04-14 DIAGNOSIS — E66.812 CLASS 2 SEVERE OBESITY DUE TO EXCESS CALORIES WITH SERIOUS COMORBIDITY AND BODY MASS INDEX (BMI) OF 38.0 TO 38.9 IN ADULT (H): ICD-10-CM

## 2023-04-14 NOTE — TELEPHONE ENCOUNTER
Medication & Dose: Topiramate 50MG Tabs  Last Written Prescription Date: 10/24/2022  Last Fill Quantity: 90  # refills: 0  Last office visit with prescribing provider: N/A  Future Office Visit with [facility] Provider:  N/A    Pharmacy (specify location):   [id number] [place], [place] - [address] AT [facility] [address]

## 2023-04-14 NOTE — TELEPHONE ENCOUNTER
Called pt re: topiramate refill  No answer. Asked to call clinic, number provided  Sarah Nolasco RN on 4/14/2023 at 4:07 PM

## 2023-04-17 RX ORDER — TOPIRAMATE 50 MG/1
50 TABLET, FILM COATED ORAL EVERY EVENING
Qty: 90 TABLET | Refills: 0 | OUTPATIENT
Start: 2023-04-17

## 2023-04-17 NOTE — TELEPHONE ENCOUNTER
Spoke to the patient and helped him to make an appointment with Dr. Arceo in June which is the earliest he could get in.  Janelle Meier RN

## 2023-06-03 NOTE — PROGRESS NOTES
[facility] Non Surgical Follow up Visit   Date of visit: 6/5/2023  Physician: DANE Arceo MD, MD  Primary Care is Antonio Lujan.  Derek Harrington   68 year old  male     Initial Weight: 270#  Initial BMI: 38.84  Today's Weight:   Wt Readings from Last 1 Encounters:   06/05/23 119.3 kg (263 lb)     Body mass index is 36.68 kg/m .           Assessment and Plan   Assessment: Derek is a 68 year old year old male who presents for medical weight management.      Plan:    1. Morbid obesity (H)  Patient was congratulated on his success thus far. Healthy habits to assist with further weight loss were discussed. He needs to learn how to eat to stabilize his blood sugars instead of eating to raise them. He will continue the topamax. We discussed the patient's co-morbid conditions including hypertension and type 2 DM. These likely will improve with healthy habits and weight loss.    2. Benign hypertension  This may improve with healthy habits and weight loss.    3. Hyperlipidemia, unspecified hyperlipidemia type  This may improve with healthy habits and weight loss.    Follow up in 1 month with the dietician and in 3 months with myself           INTERIM HISTORY  Patient has been lost to follow up since September. He is taking topamax and he is not sure it is helping to control his appetite. He was diagnosed with Diabetes the week of Christmas. He is now on insulin. He is monitoring his blood sugars and they have been pretty stable recently. He does go low occasionally.     DIETARY HISTORY  Meals Per Day: 3  Eating Protein First?: sometimes  Food Diary: B:eggs and sausage, sometimes pancakes or waffles L:fish, occasional vegetabes D:vegetables, protein, and starch  Snacks Per Day: varies  Typical Snack: Reeses Peanut butter cups when his blood sugar goes too low  Fluid Intake: 3 bottles of water per day  Portion Control: sometimes  Calorie Containing Beverages: orange juice when sugars are  "low    Positive Changes Since Last Visit: learning about diabetes management  Struggling With: water intake, exercise, focusing on eating to raise his BS    Knowledgeable in Reading Food Labels: no  Getting Adequate Protein: not sure    PHYSICAL ACTIVITY PATTERNS:  1 hour per day, exercise bike then weights    REVIEW OF SYSTEMS  GENERAL/CONSTITUTIONAL:  Fatigue: no  HEENT:   glaucoma: no  CARDIOVASCULAR:  History of heart disease: no  PULMONARY:  Dyspnea on exertion: sometimes  GI:  Pancreatitis: not discussed  PSYCHIATRIC:  Moods: stable  ENDOCRINE:  Monitoring Blood Sugars: yes  Sugars Well Controlled: sometimes  No personal or family history of medullary thyroid cancer not discussed  :  Birth control: male       Patient Profile   Social History     Social History Narrative     Not on file        Past Medical History   Past Medical History:   Diagnosis Date     History of diabetes mellitus     pre-diabetic     Hypertension      Patient Active Problem List   Diagnosis     Benign hypertension     Chronic obstructive pulmonary disease (H)     Frequent falls     History of subdural hematoma (post traumatic)     Hyperlipidemia     Insomnia     Lower urinary tract symptoms due to benign prostatic hyperplasia     Nocturia     Obesity     Seasonal allergies     Sepsis (H)     Chronic kidney disease, stage 3a (H)     Morbid obesity (H)     Benign neoplasm of descending colon       Past Surgical History  He has no past surgical history on file.     Examination   Ht 1.803 m (5' 11\")   Wt 119.3 kg (263 lb)   BMI 36.68 kg/m    Wt Readings from Last 4 Encounters:   06/05/23 119.3 kg (263 lb)   03/26/23 121.1 kg (267 lb)   09/20/22 127.9 kg (282 lb)   06/14/22 127.2 kg (280 lb 8 oz)     General: alert, NAD  Psyche: sounds stable       Counseling:   We reviewed the important post op bariatric recommendations:  -eating 3 meals daily  -eating protein first, getting >60gm protein daily  -eating slowly, chewing food " well  -avoiding/limiting calorie containing beverages  -limiting starchy vegetables and carbohydrates, choosing wheat, not white with breads,   crackers, pastas, óscar, bagels, tortillas, rice  -limiting restaurant or cafeteria eating to twice a week or less    We discussed the importance of restorative sleep and stress management in maintaining a healthy weight.  We discussed the National Weight Control Registry healthy weight maintenance strategies and ways to optimize metabolism.  We discussed the importance of physical activity including cardiovascular and strength training in maintaining a healthier weight.    Total time spent on the date of this encounter doing: chart review, review of test results, patient visit, physical exam, education, counseling, developing plan of care and documenting = 38 minutes.         DANE Arceo MD  [facility]

## 2023-06-05 ENCOUNTER — VIRTUAL VISIT (OUTPATIENT)
Dept: SURGERY | Facility: CLINIC | Age: 68
End: 2023-06-05
Payer: COMMERCIAL

## 2023-06-05 VITALS — HEIGHT: 71 IN | BODY MASS INDEX: 36.82 KG/M2 | WEIGHT: 263 LBS

## 2023-06-05 DIAGNOSIS — E66.01 MORBID OBESITY (H): Primary | ICD-10-CM

## 2023-06-05 DIAGNOSIS — E78.5 HYPERLIPIDEMIA, UNSPECIFIED HYPERLIPIDEMIA TYPE: ICD-10-CM

## 2023-06-05 DIAGNOSIS — I10 BENIGN HYPERTENSION: ICD-10-CM

## 2023-06-05 DIAGNOSIS — E66.812 CLASS 2 SEVERE OBESITY DUE TO EXCESS CALORIES WITH SERIOUS COMORBIDITY AND BODY MASS INDEX (BMI) OF 38.0 TO 38.9 IN ADULT (H): ICD-10-CM

## 2023-06-05 DIAGNOSIS — E66.01 CLASS 2 SEVERE OBESITY DUE TO EXCESS CALORIES WITH SERIOUS COMORBIDITY AND BODY MASS INDEX (BMI) OF 38.0 TO 38.9 IN ADULT (H): ICD-10-CM

## 2023-06-05 PROCEDURE — 99213 OFFICE O/P EST LOW 20 MIN: CPT | Mod: 93 | Performed by: FAMILY MEDICINE

## 2023-06-05 RX ORDER — INSULIN GLARGINE 100 [IU]/ML
INJECTION, SOLUTION SUBCUTANEOUS
COMMUNITY
Start: 2023-04-04

## 2023-06-05 RX ORDER — LEVOTHYROXINE SODIUM 150 UG/1
TABLET ORAL
COMMUNITY
Start: 2023-05-12

## 2023-06-05 RX ORDER — FLURBIPROFEN SODIUM 0.3 MG/ML
SOLUTION/ DROPS OPHTHALMIC
COMMUNITY
Start: 2023-05-23

## 2023-06-05 RX ORDER — INSULIN LISPRO 100 [IU]/ML
INJECTION, SOLUTION INTRAVENOUS; SUBCUTANEOUS
COMMUNITY
Start: 2023-05-23

## 2023-06-05 RX ORDER — TOPIRAMATE 50 MG/1
50 TABLET, FILM COATED ORAL EVERY EVENING
Qty: 90 TABLET | Refills: 0 | Status: SHIPPED | OUTPATIENT
Start: 2023-06-05 | End: 2023-09-07

## 2023-06-05 RX ORDER — METFORMIN HCL 500 MG
TABLET, EXTENDED RELEASE 24 HR ORAL
COMMUNITY
Start: 2023-03-09

## 2023-06-05 RX ORDER — DOXAZOSIN 4 MG/1
1 TABLET ORAL
COMMUNITY
Start: 2023-05-12

## 2023-06-05 NOTE — LETTER
6/5/2023         RE: Derek Harrington  [address] [place] [address]        Dear Colleague,    Thank you for referring your patient, Derek Harrington, to the [facility] AND [facility]IATRICS CARE [place]. Please see a copy of my visit note below.    Bariatric Care Clinic Non Surgical Follow up Visit   Date of visit: 6/5/2023  Physician: DANE Arceo MD, MD  Primary Care is Antonio Lujan.  Derek Harrington   68 year old  male     Initial Weight: 270#  Initial BMI: 38.84  Today's Weight:   Wt Readings from Last 1 Encounters:   06/05/23 119.3 kg (263 lb)     Body mass index is 36.68 kg/m .           Assessment and Plan   Assessment: Derek is a 68 year old year old male who presents for medical weight management.      Plan:    1. Morbid obesity (H)  Patient was congratulated on his success thus far. Healthy habits to assist with further weight loss were discussed. He needs to learn how to eat to stabilize his blood sugars instead of eating to raise them. He will continue the topamax. We discussed the patient's co-morbid conditions including hypertension and type 2 DM. These likely will improve with healthy habits and weight loss.    2. Benign hypertension  This may improve with healthy habits and weight loss.    3. Hyperlipidemia, unspecified hyperlipidemia type  This may improve with healthy habits and weight loss.    Follow up in 1 month with the dietician and in 3 months with myself           INTERIM HISTORY  Patient has been lost to follow up since September. He is taking topamax and he is not sure it is helping to control his appetite. He was diagnosed with Diabetes the week of Christmas. He is now on insulin. He is monitoring his blood sugars and they have been pretty stable recently. He does go low occasionally.     DIETARY HISTORY  Meals Per Day: 3  Eating Protein First?: sometimes  Food Diary: B:eggs and sausage, sometimes pancakes or waffles L:fish, occasional vegetabes  "D:vegetables, protein, and starch  Snacks Per Day: varies  Typical Snack: Reeses Peanut butter cups when his blood sugar goes too low  Fluid Intake: 3 bottles of water per day  Portion Control: sometimes  Calorie Containing Beverages: orange juice when sugars are low    Positive Changes Since Last Visit: learning about diabetes management  Struggling With: water intake, exercise, focusing on eating to raise his BS    Knowledgeable in Reading Food Labels: no  Getting Adequate Protein: not sure    PHYSICAL ACTIVITY PATTERNS:  1 hour per day, exercise bike then weights    REVIEW OF SYSTEMS  GENERAL/CONSTITUTIONAL:  Fatigue: no  HEENT:   glaucoma: no  CARDIOVASCULAR:  History of heart disease: no  PULMONARY:  Dyspnea on exertion: sometimes  GI:  Pancreatitis: not discussed  PSYCHIATRIC:  Moods: stable  ENDOCRINE:  Monitoring Blood Sugars: yes  Sugars Well Controlled: sometimes  No personal or family history of medullary thyroid cancer not discussed  :  Birth control: male       Patient Profile   Social History     Social History Narrative     Not on file        Past Medical History   Past Medical History:   Diagnosis Date     History of diabetes mellitus     pre-diabetic     Hypertension      Patient Active Problem List   Diagnosis     Benign hypertension     Chronic obstructive pulmonary disease (H)     Frequent falls     History of subdural hematoma (post traumatic)     Hyperlipidemia     Insomnia     Lower urinary tract symptoms due to benign prostatic hyperplasia     Nocturia     Obesity     Seasonal allergies     Sepsis (H)     Chronic kidney disease, stage 3a (H)     Morbid obesity (H)     Benign neoplasm of descending colon       Past Surgical History  He has no past surgical history on file.     Examination   Ht 1.803 m (5' 11\")   Wt 119.3 kg (263 lb)   BMI 36.68 kg/m    Wt Readings from Last 4 Encounters:   06/05/23 119.3 kg (263 lb)   03/26/23 121.1 kg (267 lb)   09/20/22 127.9 kg (282 lb)   06/14/22 127.2 " "kg (280 lb 8 oz)     General: alert, NAD  Psyche: sounds stable       Counseling:   We reviewed the important post op bariatric recommendations:  -eating 3 meals daily  -eating protein first, getting >60gm protein daily  -eating slowly, chewing food well  -avoiding/limiting calorie containing beverages  -limiting starchy vegetables and carbohydrates, choosing wheat, not white with breads,   crackers, pastas, óscar, bagels, tortillas, rice  -limiting restaurant or cafeteria eating to twice a week or less    We discussed the importance of restorative sleep and stress management in maintaining a healthy weight.  We discussed the National Weight Control Registry healthy weight maintenance strategies and ways to optimize metabolism.  We discussed the importance of physical activity including cardiovascular and strength training in maintaining a healthier weight.    Total time spent on the date of this encounter doing: chart review, review of test results, patient visit, physical exam, education, counseling, developing plan of care and documenting = 38 minutes.         DANE Arceo MD  [facility]               The patient has been notified of following:     \"This telephone visit will be conducted via a call between you and your physician/provider. We have found that certain health care needs can be provided without the need for a physical exam.  This service lets us provide the care you need with a short phone conversation.  If a prescription is necessary we can send it directly to your pharmacy.  If lab work is needed we can place an order for that and you can then stop by our lab to have the test done at a later time.    Telephone visits are billed at different rates depending on your insurance coverage. During this emergency period, for some insurers they may be billed the same as an in-person visit.  Please reach out to your insurance provider with any questions.    If during the course of " "the call the physician/provider feels a telephone visit is not appropriate, you will not be charged for this service.\"    Patient has given verbal consent to a Telephone visit? Yes    What phone number would you like to be contacted at? [phone]     Patient [facility]    Are there any specific questions or needs that you would like addressed at your visit today? No    Distant Location (provider location):  Off-site    Phone call duration: 20 minutes        Again, thank you for allowing me to participate in the care of your patient.        Sincerely,        DANE Arceo MD    "

## 2023-06-05 NOTE — PATIENT INSTRUCTIONS

## 2023-06-05 NOTE — PROGRESS NOTES
"  The patient has been notified of following:     \"This telephone visit will be conducted via a call between you and your physician/provider. We have found that certain health care needs can be provided without the need for a physical exam.  This service lets us provide the care you need with a short phone conversation.  If a prescription is necessary we can send it directly to your pharmacy.  If lab work is needed we can place an order for that and you can then stop by our lab to have the test done at a later time.    Telephone visits are billed at different rates depending on your insurance coverage. During this emergency period, for some insurers they may be billed the same as an in-person visit.  Please reach out to your insurance provider with any questions.    If during the course of the call the physician/provider feels a telephone visit is not appropriate, you will not be charged for this service.\"    Patient has given verbal consent to a Telephone visit? Yes    What phone number would you like to be contacted at? [phone]     Patient [facility]    Are there any specific questions or needs that you would like addressed at your visit today? No    Distant Location (provider location):  Off-site    Phone call duration: 20 minutes    "

## 2023-06-21 ENCOUNTER — VIRTUAL VISIT (OUTPATIENT)
Dept: SURGERY | Facility: CLINIC | Age: 68
End: 2023-06-21
Payer: COMMERCIAL

## 2023-06-21 DIAGNOSIS — E66.9 OBESITY (BMI 30-39.9): ICD-10-CM

## 2023-06-21 DIAGNOSIS — N18.31 CHRONIC KIDNEY DISEASE, STAGE 3A (H): Primary | ICD-10-CM

## 2023-06-21 PROCEDURE — 97802 MEDICAL NUTRITION INDIV IN: CPT | Mod: 95

## 2023-06-21 NOTE — PROGRESS NOTES
Derek Harrington is a 68 year old who is being evaluated via a billable telephone    What phone number would you like to be contacted at? [phone]  How would you like to obtain your AVS? Mail a copy          Medical Weight Loss Initial Diet Evaluation  Assessment:  This patient was referred by Dr. Arceo for MNT as treatment for Obesity which is impacting his hypertension and hyperlipidemia.   Derek is presenting today for a new weight management nutrition consultation. Pt has had an initial appointment with Dr. Arceo.    Weight loss medication: tomapax. Metformin     Personal Goals: Overall weight loss.     Anthropometrics:    Pt's weight is 271 lbs  Initial weight: 270 lbs  Weight change:  1 lbs gain   BMI: 36.6 kg/m2  Ideal body weight: 75.3 kg (166 lb 0.1 oz)  Adjusted ideal body weight: 92.9 kg (204 lb 12.9 oz)  Estimated RMR (York-St Jeor equation):  1,830 kcals x 1.2 (sedentary) = 2,200 kcals (for weight maintenance)    Recommended Protein Intake: 70-80 grams of protein/day    Medical History:  Patient Active Problem List   Diagnosis     Benign hypertension     Chronic obstructive pulmonary disease (H)     Frequent falls     History of subdural hematoma (post traumatic)     Hyperlipidemia     Insomnia     Lower urinary tract symptoms due to benign prostatic hyperplasia     Nocturia     Obesity     Seasonal allergies     Sepsis (H)     Chronic kidney disease, stage 3a (H)     Morbid obesity (H)     Benign neoplasm of descending colon      Diabetes: Yes, type 2 per Dr. Arceo report - on Humalog   HbA1c:  13.1% -December 2022    Nutrition History:   Food allergies/intolerances/cultural or religous food customs: No - does not eat pork    Weight loss history: Patient reports he is working out     Vitamins/Mineral Supplementation: see list     Dietary Recall:  Breakfast: cream of wheat, waffles, cold cereal with 1%, eggs with turkey sausage     Lunch: Pizza, fish   Dinner: vegetables, protein, and starch  Or spaghetti or Tacos   Typical Snacks: Reeses Peanut butter cups when his blood sugar goes too low        Beverages: Diet soda, Water (3 glasses), Orange juice     Exercise: exercise bike 6 min, weights, and PT workouts for 30 min 2x per day     Nutrition Diagnosis (PES statement):     Obesity related to excessive energy intake as evidence by high carb based diet, large portions and BMI of 36.68 kg/m2       Nutrition Intervention  1. Food and/or Nutrient Delivery   a. Placed emphasis on importance of developing a healthy meal routine, aiming for 3 meals a day and no snacks.  2. Nutrition Education   a. Discussed with patient how to build a meal: the importance of including a lean/low fat protein at each meal, include a source of vegetables at a minimum of lunch and dinner and limiting carbohydrate   b. Educated on sources of lean protein, portion sizes, the amount of grams found in each source. Recommend patient to aim for 20-30g protein at each meal.  c. Educated on how to read a food label: keeping total fat <10g and sugar <10g per serving.  d. Discussed the importance of adequate hydration, with emphasis on drinking 64oz of water or zero calorie beverages per day.  3. Nutrition Counseling   a. Encouraged importance of developing routine exercise for health benefits and weight loss.    Goals established by patient:   1. Aim to have lean protein with all meals  2. [place] lean protein and healthy carb together when  3. Refrain from drinking dark nahid for CDK  4. Eat your protein first.    Handouts provided:  *none    Assessment/Plan:    Pt will follow up in 2.5 month(s) with bariatrician and 3 month(s) with dietitian.       Phone call duration: 27 minutes      Originating Location (pt. Location): Home        Distant Location (provider location):  Off-site    Mode of Communication:  phone via [employer]    Physician has received verbal consent for a Video Visit from the patient? Yes      eSna Costa RD

## 2023-06-21 NOTE — LETTER
6/21/2023         RE: Derek Harrington  [address] [place] [address]        Dear Colleague,    Thank you for referring your patient, Derek Harrington, to the [facility] CL[facility] BARIATRICS C[facility] [place]. Please see a copy of my visit note below.    Derek Harrington is a 68 year old who is being evaluated via a billable telephone    What phone number would you like to be contacted at? [phone]  How would you like to obtain your AVS? Mail a copy          Medical Weight Loss Initial Diet Evaluation  Assessment:  This patient was referred by Dr. Arceo for MNT as treatment for Obesity which is impacting his hypertension and hyperlipidemia.   Derek is presenting today for a new weight management nutrition consultation. Pt has had an initial appointment with Dr. Arceo.    Weight loss medication: tomapax. Metformin     Personal Goals: Overall weight loss.     Anthropometrics:    Pt's weight is 271 lbs  Initial weight: 270 lbs  Weight change:  1 lbs gain   BMI: 36.6 kg/m2  Ideal body weight: 75.3 kg (166 lb 0.1 oz)  Adjusted ideal body weight: 92.9 kg (204 lb 12.9 oz)  Estimated RMR (Stewart-St Jeor equation):  1,830 kcals x 1.2 (sedentary) = 2,200 kcals (for weight maintenance)    Recommended Protein Intake: 70-80 grams of protein/day    Medical History:  Patient Active Problem List   Diagnosis     Benign hypertension     Chronic obstructive pulmonary disease (H)     Frequent falls     History of subdural hematoma (post traumatic)     Hyperlipidemia     Insomnia     Lower urinary tract symptoms due to benign prostatic hyperplasia     Nocturia     Obesity     Seasonal allergies     Sepsis (H)     Chronic kidney disease, stage 3a (H)     Morbid obesity (H)     Benign neoplasm of descending colon      Diabetes: Yes, type 2 per Dr. Arceo report - on Humalog   HbA1c:  13.1% -December 2022    Nutrition History:   Food allergies/intolerances/cultural or religous food customs: No - does not eat  pork    Weight loss history: Patient reports he is working out     Vitamins/Mineral Supplementation: see list     Dietary Recall:  Breakfast: cream of wheat, waffles, cold cereal with 1%, eggs with turkey sausage     Lunch: Pizza, fish   Dinner: vegetables, protein, and starch Or spaghetti or Tacos   Typical Snacks: Reeses Peanut butter cups when his blood sugar goes too low        Beverages: Diet soda, Water (3 glasses), Orange juice     Exercise: exercise bike 6 min, weights, and PT workouts for 30 min 2x per day     Nutrition Diagnosis (PES statement):     Obesity related to excessive energy intake as evidence by high carb based diet, large portions and BMI of 36.68 kg/m2       Nutrition Intervention  1. Food and/or Nutrient Delivery   a. Placed emphasis on importance of developing a healthy meal routine, aiming for 3 meals a day and no snacks.  2. Nutrition Education   a. Discussed with patient how to build a meal: the importance of including a lean/low fat protein at each meal, include a source of vegetables at a minimum of lunch and dinner and limiting carbohydrate   b. Educated on sources of lean protein, portion sizes, the amount of grams found in each source. Recommend patient to aim for 20-30g protein at each meal.  c. Educated on how to read a food label: keeping total fat <10g and sugar <10g per serving.  d. Discussed the importance of adequate hydration, with emphasis on drinking 64oz of water or zero calorie beverages per day.  3. Nutrition Counseling   a. Encouraged importance of developing routine exercise for health benefits and weight loss.    Goals established by patient:   1. Aim to have lean protein with all meals  2. [place] lean protein and healthy carb together when  3. Refrain from drinking dark nahid for CDK  4. Eat your protein first.    Handouts provided:  *none    Assessment/Plan:    Pt will follow up in 2.5 month(s) with bariatrician and 3 month(s) with dietitian.       Phone call  duration: 27 minutes      Originating Location (pt. Location): Home        Distant Location (provider location):  Off-site    Mode of Communication:  phone via [employer]    Physician has received verbal consent for a Video Visit from the patient? Yes      Sena Costa RD          Again, thank you for allowing me to participate in the care of your patient.        Sincerely,        Sena Costa RD

## 2023-09-05 NOTE — PROGRESS NOTES
"Bariatric Care Clinic Non Surgical Follow up Visit   Date of visit: 9/7/2023  Physician: DANE Arceo MD, MD  Primary Care is Antonio Lujan.  Derek Harrington   68 year old  male     Initial Weight: 270#  Initial BMI: 38.84  Today's Weight:   Wt Readings from Last 1 Encounters:   09/07/23 121.1 kg (267 lb)     Body mass index is 37.24 kg/m .           Assessment and Plan   Assessment: Derek is a 68 year old year old male who presents for medical weight management.      Plan:    1. Morbid obesity (H)  Patient was congratulated on his success thus far. Healthy habits to assist with further weight loss were discussed. He will try to gradually try to increase the amount of time he is exercising. He will try to replace some of the starch he is eating with vegetables. He will continue the topamax and will try increasing the dose. We discussed the patient's co-morbid conditions including hypertension. These likely will improve with healthy habits and weight loss.     2. Benign hypertension  This may improve with healthy habits and weight loss.     3. Type 2 diabetes mellitus without complication, with current use of insulin (H)- not long term  This has improved with healthy habits and weight loss. A1C 7/16/23 5.9     Follow up in 1 month with the dietician and in 3 months with myself            INTERIM HISTORY  Patient is taking topamax for appetite control and he is not sure it is helping.     Goals established by patient with dietician 6/21/23:   Aim to have lean protein with all meals  Marianna lean protein and healthy carb together when  Refrain from drinking dark nahid for CDK  Eat your protein first.    DIETARY HISTORY  Meals Per Day: 3  Eating Protein First?: sometimes  Food Diary: B:eggs L:sandwich or fish and rice D:fish or meat and rice  Snacks Per Day: \"too much\"  Typical Snack: candy or cheese puffs  Fluid Intake: 3 bottles per day  Portion Control: improved  Calorie Containing Beverages: diet " "pop    Positive Changes Since Last Visit: cut back on candy, stopped sugared soda  Struggling With: vegetable intake, snacking    Knowledgeable in Reading Food Labels: yes  Getting Adequate Protein: sometimes  Sleeping 7-8 hours/day sometimes    PHYSICAL ACTIVITY PATTERNS:  Exercise bike 6 minutes    REVIEW OF SYSTEMS  GENERAL/CONSTITUTIONAL:  Fatigue: no  HEENT:   glaucoma: no  CARDIOVASCULAR:  History of heart disease: yes  PSYCHIATRIC:  Moods: stable  ENDOCRINE:  Monitoring Blood Sugars: yes  Sugars Well Controlled: yes  :  Birth control: male       Patient Profile   Social History     Social History Narrative    Not on file        Past Medical History   Past Medical History:   Diagnosis Date    History of diabetes mellitus     pre-diabetic    Hypertension      Patient Active Problem List   Diagnosis    Benign hypertension    Chronic obstructive pulmonary disease (H)    Frequent falls    History of subdural hematoma (post traumatic)    Hyperlipidemia    Insomnia    Lower urinary tract symptoms due to benign prostatic hyperplasia    Nocturia    Obesity    Seasonal allergies    Sepsis (H)    Chronic kidney disease, stage 3a (H)    Morbid obesity (H)    Benign neoplasm of descending colon       Past Surgical History  He has no past surgical history on file.     Examination   Ht 1.803 m (5' 11\")   Wt 121.1 kg (267 lb)   BMI 37.24 kg/m    Wt Readings from Last 4 Encounters:   09/07/23 121.1 kg (267 lb)   06/05/23 119.3 kg (263 lb)   03/26/23 121.1 kg (267 lb)   09/20/22 127.9 kg (282 lb)          GEN: Alert and oriented in no acute distress.        Counseling:   We reviewed the important post op bariatric recommendations:  -eating 3 meals daily  -eating protein first, getting >60gm protein daily  -eating slowly, chewing food well  -avoiding/limiting calorie containing beverages  -limiting starchy vegetables and carbohydrates, choosing wheat, not white with breads,   crackers, pastas, óscar, bagels, tortillas, " "rice  -limiting restaurant or cafeteria eating to twice a week or less    We discussed the importance of restorative sleep and stress management in maintaining a healthy weight.  We discussed the National Weight Control Registry healthy weight maintenance strategies and ways to optimize metabolism.  We discussed the importance of physical activity including cardiovascular and strength training in maintaining a healthier weight.    Total time spent on the date of this encounter doing: chart review, review of test results, patient visit, physical exam, education, counseling, developing plan of care and documenting = 47 minutes.         DANE Arceo MD  [facility]               The patient has been notified of following:     \"This telephone visit will be conducted via a call between you and your physician/provider. We have found that certain health care needs can be provided without the need for a physical exam.  This service lets us provide the care you need with a short phone conversation.  If a prescription is necessary we can send it directly to your pharmacy.  If lab work is needed we can place an order for that and you can then stop by our lab to have the test done at a later time.    Telephone visits are billed at different rates depending on your insurance coverage. During this emergency period, for some insurers they may be billed the same as an in-person visit.  Please reach out to your insurance provider with any questions.    If during the course of the call the physician/provider feels a telephone visit is not appropriate, you will not be charged for this service.\"    Patient has given verbal consent to a Telephone visit? Yes    What phone number would you like to be contacted at? [phone]    Patient would like to receive their AVS by [place]    Are there any specific questions or needs that you would like addressed at your visit today? Patient frustrated that he is not " losing weight. Happy he is not gaining but would like to lose. RD and follow up scheduled.      Telephone Start Time:  2:16 pm    Telephone End Time (time telephone stopped): 2:40 PM    Originating Patient Location (pt. Location): Home      Distant Location (provider location):  Off-site    Distant Location (provider location):  [facility] SURGERY [facility]ATRICS CARE [place]

## 2023-09-07 ENCOUNTER — VIRTUAL VISIT (OUTPATIENT)
Dept: SURGERY | Facility: CLINIC | Age: 68
End: 2023-09-07
Payer: COMMERCIAL

## 2023-09-07 VITALS — HEIGHT: 71 IN | WEIGHT: 267 LBS | BODY MASS INDEX: 37.38 KG/M2

## 2023-09-07 DIAGNOSIS — I10 BENIGN HYPERTENSION: ICD-10-CM

## 2023-09-07 DIAGNOSIS — E66.01 CLASS 2 SEVERE OBESITY DUE TO EXCESS CALORIES WITH SERIOUS COMORBIDITY AND BODY MASS INDEX (BMI) OF 38.0 TO 38.9 IN ADULT (H): ICD-10-CM

## 2023-09-07 DIAGNOSIS — E66.01 MORBID OBESITY (H): Primary | ICD-10-CM

## 2023-09-07 DIAGNOSIS — E66.812 CLASS 2 SEVERE OBESITY DUE TO EXCESS CALORIES WITH SERIOUS COMORBIDITY AND BODY MASS INDEX (BMI) OF 38.0 TO 38.9 IN ADULT (H): ICD-10-CM

## 2023-09-07 DIAGNOSIS — E11.69 TYPE 2 DIABETES MELLITUS WITH OTHER SPECIFIED COMPLICATION, WITHOUT LONG-TERM CURRENT USE OF INSULIN (H): ICD-10-CM

## 2023-09-07 PROCEDURE — 99215 OFFICE O/P EST HI 40 MIN: CPT | Mod: 95 | Performed by: FAMILY MEDICINE

## 2023-09-07 RX ORDER — UMECLIDINIUM 62.5 UG/1
1 AEROSOL, POWDER ORAL DAILY
COMMUNITY
Start: 2023-04-28

## 2023-09-07 RX ORDER — ASPIRIN 81 MG/1
TABLET, CHEWABLE ORAL
COMMUNITY
Start: 2023-03-29 | End: 2023-09-07

## 2023-09-07 RX ORDER — CETIRIZINE HYDROCHLORIDE 10 MG/1
TABLET ORAL
COMMUNITY

## 2023-09-07 RX ORDER — TOPIRAMATE 50 MG/1
100 TABLET, FILM COATED ORAL EVERY EVENING
Qty: 180 TABLET | Refills: 0 | Status: SHIPPED | OUTPATIENT
Start: 2023-09-07 | End: 2024-01-03

## 2023-09-07 NOTE — LETTER
9/7/2023         RE: Derek Harrington  [address] [place] [address]        Dear Colleague,    Thank you for referring your patient, Derek Harrington, to the [facility] SURG[facility] AND [facility]ATRICS CARE [place]. Please see a copy of my visit note below.    Bariatric Care Clinic Non Surgical Follow up Visit   Date of visit: 9/7/2023  Physician: DANE Arceo MD, MD  Primary Care is Antonio Lujan.  Derek Harrington   68 year old  male     Initial Weight: 270#  Initial BMI: 38.84  Today's Weight:   Wt Readings from Last 1 Encounters:   09/07/23 121.1 kg (267 lb)     Body mass index is 37.24 kg/m .           Assessment and Plan   Assessment: Derek is a 68 year old year old male who presents for medical weight management.      Plan:    1. Morbid obesity (H)  Patient was congratulated on his success thus far. Healthy habits to assist with further weight loss were discussed. He will try to gradually try to increase the amount of time he is exercising. He will try to replace some of the starch he is eating with vegetables. He will continue the topamax and will try increasing the dose. We discussed the patient's co-morbid conditions including hypertension. These likely will improve with healthy habits and weight loss.     2. Benign hypertension  This may improve with healthy habits and weight loss.     3. Type 2 diabetes mellitus without complication, with current use of insulin (H)- not long term  This has improved with healthy habits and weight loss. A1C 7/16/23 5.9     Follow up in 1 month with the dietician and in 3 months with myself            INTERIM HISTORY  Patient is taking topamax for appetite control and he is not sure it is helping.     Goals established by patient with dietician 6/21/23:   Aim to have lean protein with all meals  [place] lean protein and healthy carb together when  Refrain from drinking dark nahid for CDK  Eat your protein first.    DIETARY HISTORY  Meals Per Day:  "3  Eating Protein First?: sometimes  Food Diary: B:eggs L:sandwich or fish and rice D:fish or meat and rice  Snacks Per Day: \"too much\"  Typical Snack: candy or cheese puffs  Fluid Intake: 3 bottles per day  Portion Control: improved  Calorie Containing Beverages: diet pop    Positive Changes Since Last Visit: cut back on candy, stopped sugared soda  Struggling With: vegetable intake, snacking    Knowledgeable in Reading Food Labels: yes  Getting Adequate Protein: sometimes  Sleeping 7-8 hours/day sometimes    PHYSICAL ACTIVITY PATTERNS:  Exercise bike 6 minutes    REVIEW OF SYSTEMS  GENERAL/CONSTITUTIONAL:  Fatigue: no  HEENT:   glaucoma: no  CARDIOVASCULAR:  History of heart disease: yes  PSYCHIATRIC:  Moods: stable  ENDOCRINE:  Monitoring Blood Sugars: yes  Sugars Well Controlled: yes  :  Birth control: male       Patient Profile   Social History     Social History Narrative     Not on file        Past Medical History   Past Medical History:   Diagnosis Date     History of diabetes mellitus     pre-diabetic     Hypertension      Patient Active Problem List   Diagnosis     Benign hypertension     Chronic obstructive pulmonary disease (H)     Frequent falls     History of subdural hematoma (post traumatic)     Hyperlipidemia     Insomnia     Lower urinary tract symptoms due to benign prostatic hyperplasia     Nocturia     Obesity     Seasonal allergies     Sepsis (H)     Chronic kidney disease, stage 3a (H)     Morbid obesity (H)     Benign neoplasm of descending colon       Past Surgical History  He has no past surgical history on file.     Examination   Ht 1.803 m (5' 11\")   Wt 121.1 kg (267 lb)   BMI 37.24 kg/m    Wt Readings from Last 4 Encounters:   09/07/23 121.1 kg (267 lb)   06/05/23 119.3 kg (263 lb)   03/26/23 121.1 kg (267 lb)   09/20/22 127.9 kg (282 lb)          GEN: Alert and oriented in no acute distress.        Counseling:   We reviewed the important post op bariatric recommendations:  -eating 3 " "meals daily  -eating protein first, getting >60gm protein daily  -eating slowly, chewing food well  -avoiding/limiting calorie containing beverages  -limiting starchy vegetables and carbohydrates, choosing wheat, not white with breads,   crackers, pastas, óscar, bagels, tortillas, rice  -limiting restaurant or cafeteria eating to twice a week or less    We discussed the importance of restorative sleep and stress management in maintaining a healthy weight.  We discussed the National Weight Control Registry healthy weight maintenance strategies and ways to optimize metabolism.  We discussed the importance of physical activity including cardiovascular and strength training in maintaining a healthier weight.    Total time spent on the date of this encounter doing: chart review, review of test results, patient visit, physical exam, education, counseling, developing plan of care and documenting = 47 minutes.         DANE Arceo MD  [facility]               The patient has been notified of following:     \"This telephone visit will be conducted via a call between you and your physician/provider. We have found that certain health care needs can be provided without the need for a physical exam.  This service lets us provide the care you need with a short phone conversation.  If a prescription is necessary we can send it directly to your pharmacy.  If lab work is needed we can place an order for that and you can then stop by our lab to have the test done at a later time.    Telephone visits are billed at different rates depending on your insurance coverage. During this emergency period, for some insurers they may be billed the same as an in-person visit.  Please reach out to your insurance provider with any questions.    If during the course of the call the physician/provider feels a telephone visit is not appropriate, you will not be charged for this service.\"    Patient has given verbal consent to " a Telephone visit? Yes    What phone number would you like to be contacted at? [phone]    Patient would like to receive their AVS by Beabloot    Are there any specific questions or needs that you would like addressed at your visit today? Patient frustrated that he is not losing weight. Happy he is not gaining but would like to lose. RD and follow up scheduled.      Telephone Start Time:  2:16 pm    Telephone End Time (time telephone stopped): 2:40 PM    Originating Patient Location (pt. Location): Home      Distant Location (provider location):  Off-site    Distant Location (provider location):  [facility] SURG[facility] CLINIC AND [facility]ATRICS C[facility] [place]                  Again, thank you for allowing me to participate in the care of your patient.        Sincerely,        DANE Arceo MD

## 2023-09-20 ENCOUNTER — VIRTUAL VISIT (OUTPATIENT)
Dept: SURGERY | Facility: CLINIC | Age: 68
End: 2023-09-20
Payer: COMMERCIAL

## 2023-09-20 DIAGNOSIS — E11.69 TYPE 2 DIABETES MELLITUS WITH OTHER SPECIFIED COMPLICATION, WITHOUT LONG-TERM CURRENT USE OF INSULIN (H): ICD-10-CM

## 2023-09-20 DIAGNOSIS — E66.9 OBESITY (BMI 30-39.9): Primary | ICD-10-CM

## 2023-09-20 PROCEDURE — 97803 MED NUTRITION INDIV SUBSEQ: CPT | Mod: 93

## 2023-09-20 NOTE — LETTER
9/20/2023         RE: Derek Harrington  [address] [place] [address]        Dear Colleague,    Thank you for referring your patient, Derek Harrington, to the [facility] SURGERY CLINIC AND BARIATRICS CARE [place]. Please see a copy of my visit note below.    Derek Harrington is a 68 year old who is being evaluated via a billable telephone       What phone number would you like to be contacted at? [phone]  How would you like to obtain your AVS? Mail a copy         Medical  Weight Loss Follow-Up Diet Evaluation  Assessment:  Derek is presenting today for a follow up weight management nutrition consultation.  This patient has had an initial appointment and was referred by Dr. Arceo for MNT as treatment for Obesity which is impacting his hypertension and hyperlipidemia.      Weight loss medication:  Topamax, Metformin  .   Pt's weight is 267 lbs  Initial weight: 271 lbs  Weight change: 4 lbs loss         No data to display              BMI: 37.24  Ideal body weight: 75.3 kg (166 lb 0.1 oz)  Adjusted ideal body weight: 93.6 kg (206 lb 6.5 oz)    Estimated RMR (Baxter-St Jeor equation):   1,830 kcals x 1.2 (sedentary) = 2,200 kcals (for weight maintenance)  Recommended Protein Intake: 70-80 grams of protein/day  Patient Active Problem List:  Patient Active Problem List   Diagnosis     Benign hypertension     Chronic obstructive pulmonary disease (H)     Frequent falls     History of subdural hematoma (post traumatic)     Hyperlipidemia     Insomnia     Lower urinary tract symptoms due to benign prostatic hyperplasia     Nocturia     Obesity     Seasonal allergies     Sepsis (H)     Chronic kidney disease, stage 3a (H)     Morbid obesity (H)     Benign neoplasm of descending colon     Diabetes: Yes, type 2 per Dr. Arceo report - on Humalog   HbA1c: 5.9% 7/16/23  per Dr. Arceo report (9/7/2023)    Progress on goals from last visit: Patient reports he is doing well with bariatric lifestyle changes.  Focusing on increasing whole grain intake and increase vegetable intake at meal time. A1c has come down nicely.  +no longer drinking dark nahid daily and switched to diet soda  + limiting snacking   + continuing his workout sessions    Aim to have lean protein with all meals - met   Pair lean protein and healthy carb together when - met   Refrain from drinking dark nahid for CDK - met   Eat your protein first. - not met     Dietary Recall:  Breakfast: eggs with turkey patties and diced potatoes, waffles, pancakes, cream of wheat, cold cereal   Lunch: pot roast with vegetables and potato   Dinner: fish or other protein and rice/potato and vegetable  Typical snacks: cheese puffs, candy, cheese sticks and crackers, cottage cheese   Beverages: diet soda, Water (not enough), orange juice   Exercise: exercise bike 7 min, weights, and PT workouts for 30 min 2x per day      Nutrition Diagnosis:    Obesity related to excessive energy intake as evidence by high carb based diet, large portions and BMI of 37.24 kg/m2          Intervention:  Food and/or nutrient delivery: Consistency with meals, choosing sugar free beverages. Controlled portion sizes.  Limiting carb intake/ increase protein intake in the AM.  Nutrition counseling: praised progress and goal setting    Monitoring/Evaluation:    Goals:  Use My plate when dishing up  plate   Choose high protein breakfast   Waffles, pancakes and hot cereal to maximum 1-2x per week     Patient to follow up in 3.5 month(s) with bariatrician and 4 month(s) with RD    Handouts   High breakfast ideas  Myplate     Phone call duration: 22 minutes      Originating Location (pt. Location): Home      Distant Location (provider location):  On-site    Mode of Communication:  Video Conference via DashwireWell    Physician has received verbal consent for a Video Visit from the patient? Yes      Sena Costa RD           Again, thank you for allowing me to participate in the care of your patient.         Sincerely,        Sena Costa RD

## 2023-09-20 NOTE — PATIENT INSTRUCTIONS
"Initial Consult / Weight Loss    My Plate   [email or web address]        High protein breakfast ideas:  -[place] products (high protein brand) - oatmeal, muffins, pancakes, etc.  -Overnight oats - there are easy \"just add water\" kinds in the grocery store or lots of recipes out there, look for one that has added protein from liquid or powder or other source  -Breakfast Egg Casseroles  -Scrambled eggs with cottage cheese whipped in  -English muffin breakfast sandwiches or burritos  -Frozen breakfast bowls OR \"Add an Egg\" bowls  -Protein bars - 10/10 rule is a good rule of thumb (Brands: [employer] [place] Protein Bar, RX bar, Skout Protein Bars)  -High protein tortillas or low carb tortilla for breakfast burrito  -Turkey, Veggie, Chicken, Black bean burgers in the frozen section - add cheese and fried egg on top  -Greek Yogurt (examples: plain greek, Oikos Triple Zero, Dannon Light N Fit, Two Good Yogurt), with choice of fresh or frozen fruit, myra seeds, hemp seeds, nuts  -1 slice whole wheat bread with mini avocado or half regular sized avocado, \"Everything But the Bagel\" seasoning, [place] webb on the side or on top  -2-3 light string cheeses with fruit (banana, fruit cup, berries, etc.)  -Cottage cheese and fruit on top  -Breakfast Skillet: sauteed bell peppers, onions with eggs and sprinkle of cheese (tip: batch prep sauteed peppers and onions for the week for a faster breakfast)     150 Calories or Less Snack Ideas   1 hardboiled egg with   cup berries  1 small apple with 1 hardboiled egg  10 almonds with   cup berries  2 clementines with 1 light string cheese  1 light string cheese with   sliced apple  1 light string cheese wrapped in 2 slices of turkey  5 100% whole wheat crackers (e.g. Triscuit) with 1 light string cheese    c. cottage cheese with   cup fruit and 1 Tbsp sunflower seeds     cup cottage cheese with   of an avocado     can tuna fish with 1 cup sliced cucumbers   2 oz turkey slices with 1 cup " carrots  1 container (6 oz) of low sugar (less than 10 grams of sugar) greek yogurt   3 Tablespoons of hummus with 1 cup sliced bell peppers, carrots or vegetable of your choice  4 Tablespoons ranch dip made with plain Greek Yogurt and 3 mini cucumbers  1/4 cup nuts (any kind)  1 Tablespoon peanut butter with 1 stalk celery   1 dill pickle wrapped in 1-2 slices of deli ham with 1 tsp of light cream cheese  5 100% whole wheat crackers (e.g. Triscuit) with 1 tsp each of guacamole/avocado topped with a cherry tomato - season with pepper or Everything Bagel Seasoning

## 2023-09-20 NOTE — PROGRESS NOTES
Derek Harrington is a 68 year old who is being evaluated via a billable telephone       What phone number would you like to be contacted at? [phone]  How would you like to obtain your AVS? Mail a copy         Medical  Weight Loss Follow-Up Diet Evaluation  Assessment:  Derek is presenting today for a follow up weight management nutrition consultation.  This patient has had an initial appointment and was referred by Dr. Arceo for MNT as treatment for Obesity which is impacting his hypertension and hyperlipidemia.      Weight loss medication:  Topamax, Metformin  .   Pt's weight is 267 lbs  Initial weight: 271 lbs  Weight change: 4 lbs loss         No data to display              BMI: 37.24  Ideal body weight: 75.3 kg (166 lb 0.1 oz)  Adjusted ideal body weight: 93.6 kg (206 lb 6.5 oz)    Estimated RMR ([place]-St Jeor equation):   1,830 kcals x 1.2 (sedentary) = 2,200 kcals (for weight maintenance)  Recommended Protein Intake: 70-80 grams of protein/day  Patient Active Problem List:  Patient Active Problem List   Diagnosis    Benign hypertension    Chronic obstructive pulmonary disease (H)    Frequent falls    History of subdural hematoma (post traumatic)    Hyperlipidemia    Insomnia    Lower urinary tract symptoms due to benign prostatic hyperplasia    Nocturia    Obesity    Seasonal allergies    Sepsis (H)    Chronic kidney disease, stage 3a (H)    Morbid obesity (H)    Benign neoplasm of descending colon     Diabetes: Yes, type 2 per Dr. Arceo report - on Humalog   HbA1c: 5.9% 7/16/23  per Dr. Arceo report (9/7/2023)    Progress on goals from last visit: Patient reports he is doing well with bariatric lifestyle changes. Focusing on increasing whole grain intake and increase vegetable intake at meal time. A1c has come down nicely.  +no longer drinking dark nahid daily and switched to diet soda  + limiting snacking   + continuing his workout sessions    Aim to have lean protein with all meals - met   Pair  lean protein and healthy carb together when - met   Refrain from drinking dark nahid for CDK - met   Eat your protein first. - not met     Dietary Recall:  Breakfast: eggs with turkey patties and diced potatoes, waffles, pancakes, cream of wheat, cold cereal   Lunch: pot roast with vegetables and potato   Dinner: fish or other protein and rice/potato and vegetable  Typical snacks: cheese puffs, candy, cheese sticks and crackers, cottage cheese   Beverages: diet soda, Water (not enough), orange juice   Exercise: exercise bike 7 min, weights, and PT workouts for 30 min 2x per day      Nutrition Diagnosis:    Obesity related to excessive energy intake as evidence by high carb based diet, large portions and BMI of 37.24 kg/m2          Intervention:  Food and/or nutrient delivery: Consistency with meals, choosing sugar free beverages. Controlled portion sizes.  Limiting carb intake/ increase protein intake in the AM.  Nutrition counseling: praised progress and goal setting    Monitoring/Evaluation:    Goals:  Use My plate when dishing up  plate   Choose high protein breakfast   Waffles, pancakes and hot cereal to maximum 1-2x per week     Patient to follow up in 3.5 month(s) with bariatrician and 4 month(s) with SEGUN    Handouts   High breakfast ideas  Myplate     Phone call duration: 22 minutes      Originating Location (pt. Location): Home      Distant Location (provider location):  On-site    Mode of Communication:  Video Conference via [employer]ruitWell    Physician has received verbal consent for a Video Visit from the patient? Yes      Sena Costa RD

## 2023-10-11 ENCOUNTER — LAB REQUISITION (OUTPATIENT)
Dept: LAB | Facility: CLINIC | Age: 68
End: 2023-10-11

## 2023-10-11 DIAGNOSIS — N40.1 BENIGN PROSTATIC HYPERPLASIA WITH LOWER URINARY TRACT SYMPTOMS: ICD-10-CM

## 2023-10-11 DIAGNOSIS — E03.9 HYPOTHYROIDISM, UNSPECIFIED: ICD-10-CM

## 2023-10-11 DIAGNOSIS — E11.22 TYPE 2 DIABETES MELLITUS WITH DIABETIC CHRONIC KIDNEY DISEASE (H): ICD-10-CM

## 2023-10-11 DIAGNOSIS — E78.5 HYPERLIPIDEMIA, UNSPECIFIED: ICD-10-CM

## 2023-10-11 PROCEDURE — 80053 COMPREHEN METABOLIC PANEL: CPT | Performed by: FAMILY MEDICINE

## 2023-10-11 PROCEDURE — 80061 LIPID PANEL: CPT | Performed by: FAMILY MEDICINE

## 2023-10-11 PROCEDURE — 84443 ASSAY THYROID STIM HORMONE: CPT | Performed by: FAMILY MEDICINE

## 2023-10-11 PROCEDURE — G0103 PSA SCREENING: HCPCS | Performed by: FAMILY MEDICINE

## 2023-10-12 LAB
ALBUMIN SERPL BCG-MCNC: 4.4 G/DL (ref 3.5–5.2)
ALP SERPL-CCNC: 89 U/L (ref 40–129)
ALT SERPL W P-5'-P-CCNC: 22 U/L (ref 0–70)
ANION GAP SERPL CALCULATED.3IONS-SCNC: 13 MMOL/L (ref 7–15)
AST SERPL W P-5'-P-CCNC: 19 U/L (ref 0–45)
BILIRUB SERPL-MCNC: 0.3 MG/DL
BUN SERPL-MCNC: 29.6 MG/DL (ref 8–23)
CALCIUM SERPL-MCNC: 10.1 MG/DL (ref 8.8–10.2)
CHLORIDE SERPL-SCNC: 102 MMOL/L (ref 98–107)
CHOLEST SERPL-MCNC: 150 MG/DL
CREAT SERPL-MCNC: 1.36 MG/DL (ref 0.67–1.17)
DEPRECATED HCO3 PLAS-SCNC: 25 MMOL/L (ref 22–29)
EGFRCR SERPLBLD CKD-EPI 2021: 57 ML/MIN/1.73M2
GLUCOSE SERPL-MCNC: 119 MG/DL (ref 70–99)
HDLC SERPL-MCNC: 43 MG/DL
LDLC SERPL CALC-MCNC: 53 MG/DL
NONHDLC SERPL-MCNC: 107 MG/DL
POTASSIUM SERPL-SCNC: 4.5 MMOL/L (ref 3.4–5.3)
PROT SERPL-MCNC: 6.9 G/DL (ref 6.4–8.3)
PSA SERPL DL<=0.01 NG/ML-MCNC: 0.96 NG/ML (ref 0–4.5)
SODIUM SERPL-SCNC: 140 MMOL/L (ref 135–145)
TRIGL SERPL-MCNC: 269 MG/DL
TSH SERPL DL<=0.005 MIU/L-ACNC: 1.27 UIU/ML (ref 0.3–4.2)

## 2023-12-29 NOTE — PROGRESS NOTES
Bariatric Care Clinic Non Surgical Follow up Visit   Date of visit: 1/3/2024  Physician: DANE Arceo MD, MD  Primary Care is Antonio Lujan.  Derek Harrington   68 year old  male     Initial Weight: 270#  Initial BMI: 38.84  Today's Weight:   Wt Readings from Last 1 Encounters:   01/03/24 123.8 kg (273 lb)     Body mass index is 38.08 kg/m .           Assessment and Plan   Assessment: Derek is a 68 year old year old male who presents for medical weight management.      Plan:    1. Morbid obesity (H)  Patient was congratulated on his success thus far. Healthy habits to assist with further weight loss were discussed. He will start looking at labels, trying to get at least 20 gm of protein per meal. He will try to limit his starch. He will continue the topamax. We discussed the patient's co-morbid conditions including hyperlipidemia and hypertension. These likely will improve with healthy habits and weight loss.     2. Hyperlipidemia, unspecified hyperlipidemia type  This may improve with healthy habits and weight loss.     3. Benign hypertension  This may improve with healthy habits and weight loss.      Follow up in 3 months with myself           INTERIM HISTORY  Patient planned to increase his dose of topamax after his last visit in September but he never did it. He feels that the one 50 mg pill is working for him and denies side effects. He does struggle with hunger at times.     DIETARY HISTORY  Meals Per Day: 3  Eating Protein First?: sometimes  Food Diary: B:eggs (3) and turkey sausages and fried potato patties or cereal or cream of wheat or waffles with syrup L:lean cuisine D:bean casserole (potatoes and green beans and canned peas) or spinach and potatoes  Snacks Per Day: trying to refrain  Typical Snack: sweets (especially over the holidays)  Fluid Intake: working on it  Portion Control: sometimes  Calorie Containing Beverages: none      Positive Changes Since Last Visit: exercise  Struggling  "With: some hunger, craves sweets    Knowledgeable in Reading Food Labels: not sure  Getting Adequate Protein: not typically  Sleeping 7-8 hours/day:usually      PHYSICAL ACTIVITY PATTERNS:  1 hour per day: exercise bike, walking with his walker    REVIEW OF SYSTEMS  GENERAL/CONSTITUTIONAL:  Fatigue: sometimes  HEENT:   glaucoma: no  CARDIOVASCULAR:  History of heart disease: no  GI:  Pancreatitis: no  NEUROLOGIC:  Paresthesias: no  History of migraine headaches: history of  PSYCHIATRIC:  Moods: stable  MUSCULOSKELETAL/RHEUMATOLOGIC  Arthralgias: yes  Myalgias: yes  ENDOCRINE:  Monitoring Blood Sugars: yes  Sugars Well Controlled: 120-140  No personal or family history of medullary thyroid cancer no  :  Birth control: male  History of kidney stones: no     Patient Profile   Social History     Social History Narrative    Not on file        Past Medical History   Past Medical History:   Diagnosis Date    History of diabetes mellitus     pre-diabetic    Hypertension      Patient Active Problem List   Diagnosis    Benign hypertension    Chronic obstructive pulmonary disease (H)    Frequent falls    History of subdural hematoma (post traumatic)    Hyperlipidemia    Insomnia    Lower urinary tract symptoms due to benign prostatic hyperplasia    Nocturia    Obesity    Seasonal allergies    Sepsis (H)    Chronic kidney disease, stage 3a (H)    Morbid obesity (H)    Benign neoplasm of descending colon       Past Surgical History  He has no past surgical history on file.     Examination   Ht 1.803 m (5' 11\")   Wt 123.8 kg (273 lb)   BMI 38.08 kg/m    Wt Readings from Last 4 Encounters:   01/03/24 123.8 kg (273 lb)   09/07/23 121.1 kg (267 lb)   06/05/23 119.3 kg (263 lb)   03/26/23 121.1 kg (267 lb)    GENERAL: Healthy, alert and no distress  EYES: Eyes grossly normal to inspection.  No discharge or erythema, or obvious scleral/conjunctival abnormalities.  RESP: No audible wheeze, cough, or visible cyanosis.  No visible " retractions or increased work of breathing.    SKIN: Visible skin clear. No significant rash, abnormal pigmentation or lesions.  NEURO: Cranial nerves grossly intact.  Mentation and speech appropriate for age.  PSYCH: Mentation appears normal, affect normal/bright, judgement and insight intact, normal speech and appearance well-groomed.        Counseling:   We reviewed the important post op bariatric recommendations:  -eating 3 meals daily  -eating protein first, getting >60gm protein daily  -eating slowly, chewing food well  -avoiding/limiting calorie containing beverages  -limiting starchy vegetables and carbohydrates, choosing wheat, not white with breads,   crackers, pastas, óscar, bagels, tortillas, rice  -limiting restaurant or cafeteria eating to twice a week or less    We discussed the importance of restorative sleep and stress management in maintaining a healthy weight.  We discussed the National Weight Control Registry healthy weight maintenance strategies and ways to optimize metabolism.  We discussed the importance of physical activity including cardiovascular and strength training in maintaining a healthier weight.    Total time spent on the date of this encounter doing: chart review, review of test results, patient visit, physical exam, education, counseling, developing plan of care and documenting = 33 minutes.         DANE Arceo MD  [facility]

## 2024-01-03 ENCOUNTER — VIRTUAL VISIT (OUTPATIENT)
Dept: SURGERY | Facility: CLINIC | Age: 69
End: 2024-01-03
Payer: COMMERCIAL

## 2024-01-03 VITALS — BODY MASS INDEX: 38.22 KG/M2 | HEIGHT: 71 IN | WEIGHT: 273 LBS

## 2024-01-03 DIAGNOSIS — E66.812 CLASS 2 SEVERE OBESITY DUE TO EXCESS CALORIES WITH SERIOUS COMORBIDITY AND BODY MASS INDEX (BMI) OF 38.0 TO 38.9 IN ADULT (H): ICD-10-CM

## 2024-01-03 DIAGNOSIS — E66.01 CLASS 2 SEVERE OBESITY DUE TO EXCESS CALORIES WITH SERIOUS COMORBIDITY AND BODY MASS INDEX (BMI) OF 38.0 TO 38.9 IN ADULT (H): ICD-10-CM

## 2024-01-03 DIAGNOSIS — E78.5 HYPERLIPIDEMIA, UNSPECIFIED HYPERLIPIDEMIA TYPE: ICD-10-CM

## 2024-01-03 DIAGNOSIS — Z86.69 HISTORY OF MIGRAINE HEADACHES: ICD-10-CM

## 2024-01-03 DIAGNOSIS — E66.01 MORBID OBESITY (H): Primary | ICD-10-CM

## 2024-01-03 DIAGNOSIS — I10 BENIGN HYPERTENSION: ICD-10-CM

## 2024-01-03 PROCEDURE — 99214 OFFICE O/P EST MOD 30 MIN: CPT | Mod: 95 | Performed by: FAMILY MEDICINE

## 2024-01-03 RX ORDER — TOPIRAMATE 50 MG/1
50 TABLET, FILM COATED ORAL EVERY EVENING
Qty: 90 TABLET | Refills: 0 | Status: SHIPPED | OUTPATIENT
Start: 2024-01-03 | End: 2024-04-24

## 2024-01-03 ASSESSMENT — PAIN SCALES - GENERAL: PAINLEVEL: EXTREME PAIN (8)

## 2024-01-03 NOTE — NURSING NOTE
Is the patient currently in the state of [place]? YES    Visit mode:TELEPHONE    If the visit is dropped, the patient can be reconnected by: TELEPHONE VISIT: Phone number: [phone]    Will anyone else be joining the visit? NO  (If patient encounters technical issues they should call [phone]    How would you like to obtain your AVS? MyChart    Are changes needed to the allergy or medication list? Pt stated no changes to allergies and Pt stated no med changes    Reason for visit: Follow Up    Yoly CELIS

## 2024-01-03 NOTE — PATIENT INSTRUCTIONS
Eat Better ? Move More ? Live Well    Eat 3 nutrient-rich meals each day    Don t skip meals--it will cause you to overeat later in the day!    Eating fiber (vegetables/fruits/whole grains) and protein with meals helps you stay full longer    Choose foods with less than 10 grams of sugar and 5 grams of fat per serving to prevent excess calories and weight re-gain  Eat around the same times each day to develop a routine eating schedule   Avoid snacking unless physically hungry.   Planned snacks: 1-2 times per day and no more than 150 calories    Eat protein first   Protein helps with healing, maintaining adequate muscle mass, reducing hunger and optimizing nutritional status   Aim for 60-80 grams of protein per day   Fill up on Fiber   Fiber comes from plants--fruits, veggies, whole grains, nuts/seeds and beans   Fiber is low in calories, high in phytonutrients and helps you stay full longer   Aim for 25-35 grams per day by eating fiber with meals and snacks  Eat [id number]   Take 20-30 minutes to eat each meal by taking small bites, chewing foods to applesauce consistency or 20-30 times before you swallow   Eating foods too fast can delay satiety/fullness signals and increase overeating   Slow down your eating by using toddler utensils, putting your fork/spoon down between bites and not watching TV or emailing during meals!   Keep a Journal         Writing down what you eat, how you feel and when you are active helps you identify new changes to work on from week to week         Look for ways to cut 100 calories from your current diet 2-3 times per day  Drink 64 ounces of 0-Calorie drinks between meals   Water   Zero calorie Propel  or Vitamin Water     SoBe [place]  Zero Calories   Crystal Light , Sugar-Free Lex-Aid , and other sugar-free lemonade or flavored brown   Keep Caffeine to less than 300mg per day ie: 3-6oz cups coffee     Work up to 45-60 minutes of physical activity most days of the week   Helps  with losing weight and prevent regaining those extra pounds!    Do a combo of cardio (walking/water exercises) and strength training (lifting weights/Vinyasa yoga)    Avoid Mindless Eating   Be present when you eat--take note of the smell, taste and quality of your food   Make a list of alternative activities you could do to prevent eating out of boredom/stress  Go for a walk, call a friend, chew gum, paint your nails, re-organize the garage, etc      LEAN PROTEIN SOURCES  (20 gms per meal)    Protein Source Portion Calories Grams of Protein                           Nonfat, plain Greek yogurt    (10 grams sugar or less) 3/4 cup (6 oz)  12-17   Light Yogurt (10 grams sugar or less) 3/4 cup (6 oz)  6-8   Protein Shake 1 shake 110-180 15-30   Skim/1% Milk or lactose-free milk 1 cup ( 8 oz)  8   Plain or light, flavored soymilk 1 cup  7-8   Plain or light, hemp milk 1 cup 110 6   Fat Free or 1% Cottage Cheese 1/2 cup 90 15   Part skim ricotta cheese 1/2 cup 100 14   Part skim or reduced fat cheese slices 1 ounce 65-80 8     Mozzarella String Cheese 1 80 8   Canned tuna, chicken, crab or salmon  (canned in water)  1/2 cup 100 15-20   White fish (broiled, grilled, baked) 3 ounces 100 21   [place]/Tuna (broiled, grilled, baked) 3 ounces 150-180 21   Shrimp, Scallops, Lobster, Crab 3 ounces 100 21   Pork loin, Pork Tenderloin 3 ounces 150 21   Boneless, skinless chicken /turkey breast                          (broiled, grilled, baked) 3 ounces 120 21   [place], [place], [place], and Venison 3 ounces 120 21   Lean cuts of red meat and pork (sirloin,   round, tenderloin, flank, ground 93%-96%) 3 ounces 170 21   Lean or Extra Lean Ground Turkey 1/2 cup 150 20   90-95% Lean [place] Burger 1 christofer 140-180 21   Low-fat casserole with lean meat 3/4 cup 200 17   Luncheon Meats                                                        (turkey, lean ham, roast beef, chicken) 3 ounces 100 21   Egg (boiled, poached,  scrambled) 1 Egg 60 7   Egg Substitute 1/2 cup 70 10   Nuts (limit to 1 serving per day)  3 Tbsp. 150 7   Nut [place] (peanut, almond)  Limit to 1 serving or less daily 1 Tbsp. 90 4   Soy Burger (varies) 1  15   Garbanzo, Black, Hyatt Beans 1/2 cup 110 7   Refried Beans 1/2 cup 100 7   Kidney and Lima beans 1/2 cup 110 7   Tempeh 3 oz 175 18   Vegan crumbles 1/2 cup 100 14   Tofu 1/2 cup 110 14   Chili (beans and extra lean beef or turkey) 1 cup 200 23   Lentil Stew/Soup 1 cup 150 12   Black Bean Soup 1 cup 175 12

## 2024-01-03 NOTE — LETTER
1/3/2024         RE: Derek Harrington  [address] [place] [address]        Dear Colleague,    Thank you for referring your patient, Derek Harrington, to the [facility] AND [facility]ATRICS CARE [place]. Please see a copy of my visit note below.    [facility] Clinic Non Surgical Follow up Visit   Date of visit: 1/3/2024  Physician: DANE Arceo MD, MD  Primary Care is Antonio Lujan.  Derek Harrington   68 year old  male     Initial Weight: 270#  Initial BMI: 38.84  Today's Weight:   Wt Readings from Last 1 Encounters:   01/03/24 123.8 kg (273 lb)     Body mass index is 38.08 kg/m .           Assessment and Plan   Assessment: Derek is a 68 year old year old male who presents for medical weight management.      Plan:    1. Morbid obesity (H)  Patient was congratulated on his success thus far. Healthy habits to assist with further weight loss were discussed. He will start looking at labels, trying to get at least 20 gm of protein per meal. He will try to limit his starch. He will continue the topamax. We discussed the patient's co-morbid conditions including hyperlipidemia and hypertension. These likely will improve with healthy habits and weight loss.     2. Hyperlipidemia, unspecified hyperlipidemia type  This may improve with healthy habits and weight loss.     3. Benign hypertension  This may improve with healthy habits and weight loss.      Follow up in 3 months with myself           INTERIM HISTORY  Patient planned to increase his dose of topamax after his last visit in September but he never did it. He feels that the one 50 mg pill is working for him and denies side effects. He does struggle with hunger at times.     DIETARY HISTORY  Meals Per Day: 3  Eating Protein First?: sometimes  Food Diary: B:eggs (3) and turkey sausages and fried potato patties or cereal or cream of wheat or waffles with syrup L:lean cuisine D:bean casserole (potatoes and green beans and canned  "peas) or spinach and potatoes  Snacks Per Day: trying to refrain  Typical Snack: sweets (especially over the holidays)  Fluid Intake: working on it  Portion Control: sometimes  Calorie Containing Beverages: none      Positive Changes Since Last Visit: exercise  Struggling With: some hunger, craves sweets    Knowledgeable in Reading Food Labels: not sure  Getting Adequate Protein: not typically  Sleeping 7-8 hours/day:usually      PHYSICAL ACTIVITY PATTERNS:  1 hour per day: exercise bike, walking with his walker    REVIEW OF SYSTEMS  GENERAL/CONSTITUTIONAL:  Fatigue: sometimes  HEENT:   glaucoma: no  CARDIOVASCULAR:  History of heart disease: no  GI:  Pancreatitis: no  NEUROLOGIC:  Paresthesias: no  History of migraine headaches: history of  PSYCHIATRIC:  Moods: stable  MUSCULOSKELETAL/RHEUMATOLOGIC  Arthralgias: yes  Myalgias: yes  ENDOCRINE:  Monitoring Blood Sugars: yes  Sugars Well Controlled: 120-140  No personal or family history of medullary thyroid cancer no  :  Birth control: male  History of kidney stones: no     Patient Profile   Social History     Social History Narrative     Not on file        Past Medical History   Past Medical History:   Diagnosis Date     History of diabetes mellitus     pre-diabetic     Hypertension      Patient Active Problem List   Diagnosis     Benign hypertension     Chronic obstructive pulmonary disease (H)     Frequent falls     History of subdural hematoma (post traumatic)     Hyperlipidemia     Insomnia     Lower urinary tract symptoms due to benign prostatic hyperplasia     Nocturia     Obesity     Seasonal allergies     Sepsis (H)     Chronic kidney disease, stage 3a (H)     Morbid obesity (H)     Benign neoplasm of descending colon       Past Surgical History  He has no past surgical history on file.     Examination   Ht 1.803 m (5' 11\")   Wt 123.8 kg (273 lb)   BMI 38.08 kg/m    Wt Readings from Last 4 Encounters:   01/03/24 123.8 kg (273 lb)   09/07/23 121.1 kg (267 " lb)   06/05/23 119.3 kg (263 lb)   03/26/23 121.1 kg (267 lb)    GENERAL: Healthy, alert and no distress  EYES: Eyes grossly normal to inspection.  No discharge or erythema, or obvious scleral/conjunctival abnormalities.  RESP: No audible wheeze, cough, or visible cyanosis.  No visible retractions or increased work of breathing.    SKIN: Visible skin clear. No significant rash, abnormal pigmentation or lesions.  NEURO: Cranial nerves grossly intact.  Mentation and speech appropriate for age.  PSYCH: Mentation appears normal, affect normal/bright, judgement and insight intact, normal speech and appearance well-groomed.        Counseling:   We reviewed the important post op bariatric recommendations:  -eating 3 meals daily  -eating protein first, getting >60gm protein daily  -eating slowly, chewing food well  -avoiding/limiting calorie containing beverages  -limiting starchy vegetables and carbohydrates, choosing wheat, not white with breads,   crackers, pastas, óscar, bagels, tortillas, rice  -limiting restaurant or cafeteria eating to twice a week or less    We discussed the importance of restorative sleep and stress management in maintaining a healthy weight.  We discussed the National Weight Control Registry healthy weight maintenance strategies and ways to optimize metabolism.  We discussed the importance of physical activity including cardiovascular and strength training in maintaining a healthier weight.    Total time spent on the date of this encounter doing: chart review, review of test results, patient visit, physical exam, education, counseling, developing plan of care and documenting = 33 minutes.         DANE Arceo MD  [facility]           Virtual Visit Details    Type of service:  Telephone Visit   Phone call duration: 21 minutes       Again, thank you for allowing me to participate in the care of your patient.        Sincerely,        DANE Arceo MD

## 2024-01-31 ENCOUNTER — VIRTUAL VISIT (OUTPATIENT)
Dept: SURGERY | Facility: CLINIC | Age: 69
End: 2024-01-31
Payer: COMMERCIAL

## 2024-01-31 DIAGNOSIS — N18.31 CHRONIC KIDNEY DISEASE, STAGE 3A (H): ICD-10-CM

## 2024-01-31 DIAGNOSIS — E66.812 CLASS 2 SEVERE OBESITY DUE TO EXCESS CALORIES WITH SERIOUS COMORBIDITY AND BODY MASS INDEX (BMI) OF 38.0 TO 38.9 IN ADULT (H): Primary | ICD-10-CM

## 2024-01-31 DIAGNOSIS — E66.01 CLASS 2 SEVERE OBESITY DUE TO EXCESS CALORIES WITH SERIOUS COMORBIDITY AND BODY MASS INDEX (BMI) OF 38.0 TO 38.9 IN ADULT (H): Primary | ICD-10-CM

## 2024-01-31 DIAGNOSIS — E11.69 TYPE 2 DIABETES MELLITUS WITH OTHER SPECIFIED COMPLICATION, WITHOUT LONG-TERM CURRENT USE OF INSULIN (H): ICD-10-CM

## 2024-01-31 PROCEDURE — 97803 MED NUTRITION INDIV SUBSEQ: CPT | Mod: 93

## 2024-01-31 NOTE — LETTER
1/31/2024         RE: Derek Harrington  [address] [place] [address]        Dear Colleague,    Thank you for referring your patient, Derek Harrington, to the [facility] SURGERY CL[facility] AND BARIATRICS CARE [place]. Please see a copy of my visit note below.    Derek Harrington is a 69 year old who is being evaluated via a billable telephone       What phone number would you like to be contacted at? [phone]  How would you like to obtain your AVS? Mail a copy         Medical  Weight Loss Follow-Up Diet Evaluation  Assessment:  Derek is presenting today for a follow up weight management nutrition consultation.  This patient has had an initial appointment and was referred by Dr. Arceo for MNT as treatment for Obesity which is impacting his hypertension and hyperlipidemia.      Weight loss medication:  Topamax, Metformin  .   Pt's weight is 273 lbs  Initial weight: 271 lbs  Weight change: 3 lbs gain         1/3/2024     2:09 PM   Changes and Difficulties   I have made the following changes to my diet since my last visit: trying to eat more green but not succedding   With regards to my diet, I am still struggling with: losing weight   I have made the following changes to my activity/exercise since my last visit: putting more exercise on the bike   With regards to my activity/exercise, I am still struggling with: no     BMI: 38.08  Ideal body weight: 75.3 kg (166 lb 0.1 oz)  Adjusted ideal body weight: 93.6 kg (206 lb 6.5 oz)    Estimated RMR ([place]-St Jeor equation):   1,830 kcals x 1.2 (sedentary) = 2,200 kcals (for weight maintenance)  Recommended Protein Intake: 70-80 grams of protein/day  Patient Active Problem List:  Patient Active Problem List   Diagnosis     Benign hypertension     Chronic obstructive pulmonary disease (H)     Frequent falls     History of subdural hematoma (post traumatic)     Hyperlipidemia     Insomnia     Lower urinary tract symptoms due to benign prostatic hyperplasia      Nocturia     Obesity     Seasonal allergies     Sepsis (H)     Chronic kidney disease, stage 3a (H)     Morbid obesity (H)     Benign neoplasm of descending colon     Diabetes: Yes, type 2   HbA1c: 5.6% 9/26/2023    Progress on goals from last visit: Patient reports he is discouraged with the lack of weight loss he is seeing. Noted he has not made significant changes to his eating habits. Exercise routine has remained consistent. We focused today discussion on non starch vegetables and lean protein choices. Encouraged patient to limit potato intake to 1 serving per day   - high carb breakfast    - high fat meat product intake     Use My plate when dishing up  plate - not met   Choose high protein breakfast - sometimes  Waffles, pancakes and hot cereal to maximum 1-2x per week - not met       Dietary Recall:  Breakfast: x3 eggs with turkey patties and breakfast potatoes Or pancakes with syrup  Or cold cereal   Lunch: potatoes, with peas or green beans Or frozen meal   Dinner: chili   Typical snacks: cheese puffs, candy, cheese sticks and crackers, cottage cheese   Beverages:   Diet sprite soda,   Water x3 16 oz glasses,   orange juice   Exercise: exercise bike 8 min, weights, and PT workouts for 30 min 2x per day      Nutrition Diagnosis:    Obesity related to excessive energy intake as evidence by high carb based diet, large portions and BMI of 38.08 kg/m2          Intervention:  Food and/or nutrient delivery:   Consistency with meals, choosing sugar free beverages.   Controlled portion sizes.    Limiting carb intake to no more than 25% of plate  At least 3-4oz of meat protein with dinner and lunch   Nutrition counseling: continued support and goal setting  Encouraged patient to continue with great workout habits.    Monitoring/Evaluation:    Goals:  Use My plate method when dishing up plate for portion control   Choose high protein breakfast 4x per week at least   Stick to 1 servings of potatoes per day  Increase  vegetable intake aiming for 2 servings per day   Waffles, pancakes and hot cereal to maximum 1-2x per week     Patient to follow up in 3.5 month(s) with bariatrician and 4 month(s) with RD    Handouts   Vegetable information     Phone call duration: 27 minutes      Originating Location (pt. Location): Home      Distant Location (provider location):  On-site    Mode of Communication:  Video Conference via [facility]    Physician has received verbal consent for a Video Visit from the patient? Yes      Sena Costa RD           Again, thank you for allowing me to participate in the care of your patient.        Sincerely,        Sena Costa RD

## 2024-01-31 NOTE — PROGRESS NOTES
Derek Harrington is a 69 year old who is being evaluated via a billable telephone       What phone number would you like to be contacted at? [phone]  How would you like to obtain your AVS? Mail a copy         Medical  Weight Loss Follow-Up Diet Evaluation  Assessment:  Derek is presenting today for a follow up weight management nutrition consultation.  This patient has had an initial appointment and was referred by Dr. Arceo for MNT as treatment for Obesity which is impacting his hypertension and hyperlipidemia.      Weight loss medication:  Topamax, Metformin  .   Pt's weight is 273 lbs  Initial weight: 271 lbs  Weight change: 3 lbs gain         1/3/2024     2:09 PM   Changes and Difficulties   I have made the following changes to my diet since my last visit: trying to eat more green but not succedding   With regards to my diet, I am still struggling with: losing weight   I have made the following changes to my activity/exercise since my last visit: putting more exercise on the bike   With regards to my activity/exercise, I am still struggling with: no     BMI: 38.08  Ideal body weight: 75.3 kg (166 lb 0.1 oz)  Adjusted ideal body weight: 93.6 kg (206 lb 6.5 oz)    Estimated RMR ([place]-St Jeor equation):   1,830 kcals x 1.2 (sedentary) = 2,200 kcals (for weight maintenance)  Recommended Protein Intake: 70-80 grams of protein/day  Patient Active Problem List:  Patient Active Problem List   Diagnosis    Benign hypertension    Chronic obstructive pulmonary disease (H)    Frequent falls    History of subdural hematoma (post traumatic)    Hyperlipidemia    Insomnia    Lower urinary tract symptoms due to benign prostatic hyperplasia    Nocturia    Obesity    Seasonal allergies    Sepsis (H)    Chronic kidney disease, stage 3a (H)    Morbid obesity (H)    Benign neoplasm of descending colon     Diabetes: Yes, type 2   HbA1c: 5.6% 9/26/2023    Progress on goals from last visit: Patient reports he is discouraged with  the lack of weight loss he is seeing. Noted he has not made significant changes to his eating habits. Exercise routine has remained consistent. We focused today discussion on non starch vegetables and lean protein choices. Encouraged patient to limit potato intake to 1 serving per day   - high carb breakfast    - high fat meat product intake     Use My plate when dishing up  plate - not met   Choose high protein breakfast - sometimes  Waffles, pancakes and hot cereal to maximum 1-2x per week - not met       Dietary Recall:  Breakfast: x3 eggs with turkey patties and breakfast potatoes Or pancakes with syrup  Or cold cereal   Lunch: potatoes, with peas or green beans Or frozen meal   Dinner: chili   Typical snacks: cheese puffs, candy, cheese sticks and crackers, cottage cheese   Beverages:   Diet sprite soda,   Water x3 16 oz glasses,   orange juice   Exercise: exercise bike 8 min, weights, and PT workouts for 30 min 2x per day      Nutrition Diagnosis:    Obesity related to excessive energy intake as evidence by high carb based diet, large portions and BMI of 38.08 kg/m2          Intervention:  Food and/or nutrient delivery:   Consistency with meals, choosing sugar free beverages.   Controlled portion sizes.    Limiting carb intake to no more than 25% of plate  At least 3-4oz of meat protein with dinner and lunch   Nutrition counseling: continued support and goal setting  Encouraged patient to continue with great workout habits.    Monitoring/Evaluation:    Goals:  Use My plate method when dishing up plate for portion control   Choose high protein breakfast 4x per week at least   Stick to 1 servings of potatoes per day  Increase vegetable intake aiming for 2 servings per day   Waffles, pancakes and hot cereal to maximum 1-2x per week     Patient to follow up in 3.5 month(s) with bariatrician and 4 month(s) with RD    Handouts   Vegetable information     Phone call duration: 27 minutes      Originating Location (pt.  Location): Home      Distant Location (provider location):  On-site    Mode of Communication:  Video Conference via [facility]    Physician has received verbal consent for a Video Visit from the patient? Yes      Sena Costa RD

## 2024-01-31 NOTE — PATIENT INSTRUCTIONS
Goals:  Use My plate method when dishing up plate for portion control   Choose high protein breakfast 4x per week at least   Stick to 1 servings of potatoes per day  Increase vegetable intake aiming for 2 servings per day   Waffles, pancakes and hot cereal to maximum 1-2x per week   Follow 10g or less of total sugar and total fat per serving of food item                Carbohydrate Counting for Diabetes  What is carbohydrate? Carbohydrate refers to anything that breaks down into sugar including sugar.   What foods contain carbohydrate?    Grains and StarchyVegetables: bread, cereal, pasta, rice, potatoes, peas, corn   Fruit: all fruit including fresh, canned, frozen, and dried   Dairy: milk and yogurt   Sweets/Desserts: cookies, cake,pie, ice cream   Non-starchy Vegetables: green beans, broccoli, cauliflower, carrots, cucumbers    Why is it important? Carbohydrates impact blood sugars more quicklythan foods with protein and fat. Some carbohydrates bring blood sugars up more quickly than others. Look for foods that include higher amounts of fiber. Good sources of fiber include whole grains, fruitsand vegetables. Meals should include all three nutrients. For optimal nutrition, try to include carbohydrates from multiple food groups instead of coming just from one food group. For example, a meal might include:  Roasted sweet potato, asparagus sautéed in olive oil, baked chicken, and mixed berries    What is a serving of carbohydrate? 15 grams of carbohydrate = 1serving of carbohydrate  How much carbohydrate do I need? Your nutritionist will calculate how many grams of carbohydrate you need and help to divide them out throughout the day     Carbohydrate Counting Food Lists  One serving = 15 grams Carbohydrate    One serving = 15 grams Carbohydrate    FRUIT  (weight includes core, peel, and seeds)   Apple, unpeeled, small 1 (4 oz)   Applesauce, unsweetened   cup   Apples, dried 4 rings   Apricots fresh 4 whole (5   oz)  "  Banana 1 (4\" across)   Blackberries 1 cup   Blueberries   cup   Cantaloupe/Honeydew melon 1 cup cubed   Cherries, sweet, fresh 12 (3 oz)   Canned fruit   cup   Grapefruit, large   (11 oz)   Grapefruit, canned or Mandarin oranges, canned   cup   Grapes, small 17 (3 oz)   Kiwi 1 (3   oz)   [place], small   fruit (5   oz) or   cup   Nectarine, small 1 (5 oz)   Orange, small 1 (6   oz)   Papaya   fruit (8 oz) or 1 cup cubed   Peach, medium, fresh 1 (6 oz)   Pear, large, fresh   (4 oz)   Pineapple, fresh   cup   Plums, small 2 (5 oz)   Raisins 2 Tbsp   Raspberries 1 cup   Strawberries 1   cup whole berries   Tangerines, small 2 (8 oz)   Watermelon 1 slices (13   oz) or 1   cup cubed     BREAD   Bagel   (1 oz)   Bread, reduced calorie 2 slices   Bread, whole-wheat, pumpernickel, rye 1 slice (1 oz)   English muffin     Hot dog or hamburger bun   (1 oz)   [place]icia, 6\" across     Tortilla, corn or flour, 6\" 1   Waffle, 4.5\" square 1     CEREALS AND GRAINS   Bran cereals   cup   Cooked oatmeal/Grits/Cream of wheat   cup   Pasta or Rice cooked 1/3 cup    Shredded wheat   cup   Granola/Grape-Nuts   cup   Quinoa/Couscous 1/3 cup   Flour/Cornmeal (dry) 3 Tbsp     LEGUMES AND STARCHY VEGETABLES   Beans, baked 1/3 cup   Beans and peas, cooked: kidney, chung, white, split, black-eyed, garbanzo, lentils   cup   Lima beans, cooked   cup   Corn   cup   Corn, medium 3\" 1 (5 oz)   Potato, baked 1 small (3 oz)   Potato, mashed   cup   Squash, winter (acorn, butternut) 1 cup   Yam, sweet potato, plain   cup     FRUIT JUICE           cup = 4 oz   Apple/cider/orange/grapefruit/ pineapple juice   cup   Cranberry juice cocktail/grape juice 1/3 cup   Juice blends, reduced-calorie 1 cup     MILK   1 cup = 8 oz = 12 g Carb   Milk (skim, whole, 1%, 2%) 1 cup   Chocolate milk   cup   Evaporated milk   cup   Dry milk 1/3 cup   Buttermilk 1 cup   Plain, yogurt   cup     COMBINATION FOODS Carb   Burrito, bean, 7\" 1 burrito 45 g   Burrito, meat, 7\" 1 " "burrito 30 g   Casserole or hot dish 1 cup 30 g   Chili with beans 1 cup 30 g   Chow mein noodles   cup 15 g   [place]ian Toast 1 slice 15 g   Granola bar 1 20 g   Hummus 1/3 cup 15 g   Lasagna, 3\" x 4\" 1 piece 30 g   Macaroni & cheese  1 cup 45 g   Muffin, plain 1 small 15 g   Pancake, silver dollar 4\" across 15 g   Pasta salad 1 cup 45 g   Spaghetti or pasta sauce   cup 15 g   Stuffing, bread (prepared) 1/3 cup 15 g   Taco shell, 5\" across 2 15 g     NON-STARCHY VEGETABLES    cup cooked or 1 cup raw = 5 g Carb   Artichoke Leeks   Asparagus Mixed vegetables (without corn, peas, or pasta)   Beans (green, wax, Italian) Mushrooms   Bean sprouts Okra   Beets Onions   Broccoli Pea pods   [place] sprouts Peppers   Cabbage Radishes   Carrots Salad greens   Cauliflower Sauerkraut   Celery Spinach   Cucumber Summer squash   Eggplant Tomato (canned, sauce, juice)   Green onions or scallions Turnips   Greens (arnulfo, kale, mustard, turnip) Water chestnuts   Kohlrabi Watercress    zucchini     CONDIMENTS Carb   Sugar/Honey/Brown sugar/Syrup 1 Tbsp 15 g   Jam or Jelly, regular 1 Tbsp 15 g   Ketchup   cup 20 g   Peanut Butter   cup 12 g   Barbeque/Sweet & Sour sauce 3 Tbsp 15 g   Gravy   cup 7.5 g   Marinade 1 Tbsp 7.5 g   Yusuf sauce 1 oz 15 g       Low Carbohydrate Foods   Low carbohydrate foods have 5 or fewer grams of carbohydrate but in most cases still contain calories. They can affect blood sugar if consumed in large amounts so stick to the serving sizes listed.    Cream cheese/ Mayonnaise/ Salad dressing/ Sour cream/ avocado 1 Tbsp   Ketchup 1 Tbsp   Salsa   cup   Nuts 1 oz     Unlimited Use  Bouillon & broth Horseradish Soy sauce Taco sauce   Club soda, unsweetened Lemon or lime juice [facility] sauce Tabasco/ hot sauce   Garlic Mineral / Tonic water Pickles  Tea    Herbs Mustard Pimento Vinegar        Meat/Meat Substitutes (do not count towards carb at meals) Peanut butter/nut butter (1 T) Tofu   Beef, chicken, fish, " ham, lamb, pork, seafood Cottage cheese & cheese Eggs   Choose Your Foods Food Lists for Diabetes. (2014). Academy of Nutrition and Dietetics, American Diabetes Association.          LEAN PROTEIN SOURCES    Protein Source Portion Calories Grams of Protein                           Nonfat, plain Greek yogurt    (10 grams sugar or less) 3/4 cup (6 oz)  12-17   Light Yogurt (10 grams sugar or less) 3/4 cup (6 oz)  6-8   Protein Shake 1 shake 110-180 15-30   Skim/1% Milk or lactose-free milk 1 cup ( 8 oz)  8   Plain or light, flavored soymilk 1 cup  7-8   Plain or light, hemp milk 1 cup 110 6   Fat Free or 1% Cottage Cheese 1/2 cup 90 15   Part skim ricotta cheese 1/2 cup 100 14   Part skim or reduced fat cheese slices 1/4cup, 3 dice 65-80 8     Mozzarella String Cheese 1 80 8   Canned tuna, chicken, crab or salmon  (canned in water)  1/2 cup 100 15-20   White fish (broiled, grilled, baked) 3 ounces 100 21   [place]/Tuna (broiled, grilled, baked) 3 ounces 150-180 21   Shrimp, Scallops, Lobster, Crab 3 ounces 100 21   Pork loin, Pork Tenderloin 3 ounces 150 21   Boneless, skinless chicken /turkey breast                          (broiled, grilled, baked) 3 ounces 120 21   [place], [place], [place], and Venison 3 ounces 120 21   Lean cuts of red meat and pork (sirloin,   round, tenderloin, flank, ground 93%-96%) 3 ounces 170 21   Lean or Extra Lean Ground Turkey 1/2 cup 150 20   90-95% Lean [place] Burger 1 christofer 140-180 21   Low-fat casserole with lean meat 3/4 cup 200 17   Luncheon Meats                                                        (turkey, lean ham, roast beef, chicken) 3 ounces 100 21   Egg (boiled, poached, scrambled) 1 Egg 60 7   Egg Substitute 1/2 cup 70 10   Nuts (limit to 1 serving per day)  3 Tbsp. 150 7   Nut Luck (peanut, almond)  Limit to 1 serving or less daily 1 Tbsp. 90 4   Soy Burger (varies) 1  10-15   Garbanzo, Black, [place] Beans/Edamame 1/2 cup 110 7-9   Refried Beans  1/2 cup 100 7   Kidney and Lima beans 1/2 cup 110 7   Tempeh 3 oz 175 18   Vegan crumbles 1/2 cup 100 14   Tofu 1/2 cup 110 14   Chili (beans and extra lean beef or turkey) 1 cup 200 23   Lentils 1/2 cup 115 9   Black Bean Soup 1 cup 175 12

## 2024-02-07 ENCOUNTER — LAB REQUISITION (OUTPATIENT)
Dept: LAB | Facility: CLINIC | Age: 69
End: 2024-02-07

## 2024-02-07 DIAGNOSIS — N18.31 CHRONIC KIDNEY DISEASE, STAGE 3A (H): ICD-10-CM

## 2024-02-07 PROCEDURE — 80048 BASIC METABOLIC PNL TOTAL CA: CPT | Performed by: FAMILY MEDICINE

## 2024-02-08 LAB
ANION GAP SERPL CALCULATED.3IONS-SCNC: 15 MMOL/L (ref 7–15)
BUN SERPL-MCNC: 33.8 MG/DL (ref 8–23)
CALCIUM SERPL-MCNC: 10 MG/DL (ref 8.8–10.2)
CHLORIDE SERPL-SCNC: 103 MMOL/L (ref 98–107)
CREAT SERPL-MCNC: 1.34 MG/DL (ref 0.67–1.17)
DEPRECATED HCO3 PLAS-SCNC: 22 MMOL/L (ref 22–29)
EGFRCR SERPLBLD CKD-EPI 2021: 58 ML/MIN/1.73M2
GLUCOSE SERPL-MCNC: 80 MG/DL (ref 70–99)
POTASSIUM SERPL-SCNC: 4.1 MMOL/L (ref 3.4–5.3)
SODIUM SERPL-SCNC: 140 MMOL/L (ref 135–145)

## 2024-04-24 ENCOUNTER — VIRTUAL VISIT (OUTPATIENT)
Dept: SURGERY | Facility: CLINIC | Age: 69
End: 2024-04-24
Payer: COMMERCIAL

## 2024-04-24 VITALS — HEIGHT: 71 IN | WEIGHT: 275 LBS | BODY MASS INDEX: 38.5 KG/M2

## 2024-04-24 DIAGNOSIS — Z86.69 HISTORY OF MIGRAINE HEADACHES: ICD-10-CM

## 2024-04-24 DIAGNOSIS — I10 BENIGN HYPERTENSION: ICD-10-CM

## 2024-04-24 DIAGNOSIS — E66.01 MORBID OBESITY (H): Primary | ICD-10-CM

## 2024-04-24 PROCEDURE — 99443 PR PHYSICIAN TELEPHONE EVALUATION 21-30 MIN: CPT | Mod: 93 | Performed by: FAMILY MEDICINE

## 2024-04-24 RX ORDER — TOPIRAMATE 50 MG/1
100 TABLET, FILM COATED ORAL EVERY EVENING
Qty: 180 TABLET | Refills: 0 | Status: SHIPPED | OUTPATIENT
Start: 2024-04-24

## 2024-04-24 ASSESSMENT — PAIN SCALES - GENERAL: PAINLEVEL: NO PAIN (0)

## 2024-04-24 NOTE — PROGRESS NOTES
[facility] Care Clinic Non Surgical Follow up Visit   Date of visit: 4/24/2024  Physician: DANE Arceo MD, MD  Primary Care is Antonio Lujan.  Derek Harrington   69 year old  male     Initial Weight: 270#  Initial BMI: 38.84  Today's Weight:   Wt Readings from Last 1 Encounters:   04/24/24 124.7 kg (275 lb)     Body mass index is 38.35 kg/m .           Assessment and Plan   Assessment: Derek is a 69 year old year old male who presents for medical weight management.      Plan:    1. Morbid obesity (H)  Patient was congratulated on his success thus far. Healthy habits to assist with further weight loss were discussed. He will try to keep up his exercise and increase his protein and vegetables. He will continue the metformin and topamax and he will try to increase the topamax to 100 mg. We discussed the patient's co-morbid conditions including hypertension and migraine headaches. These likely will improve with healthy habits and weight loss.     2. Benign hypertension  This may improve with healthy habits and weight loss.     3. History of migraine headaches  This may improve with healthy habits and weight loss.       4. Diabetes Mellitus  This may improve with healthy habits and weight loss. I will reach out to his primary MD. He is a candidate for ozempic but he would need to cut back on his insulin.     Follow up in 3 months with myself    Addendum: I left a message for his primary MD Dr. Antonio Lujan on 4/25/24. Will wait to hear from him before attempting to prescribe a GLP 1 agonist.       INTERIM HISTORY  Derek has been taking topamax and metformin for appetite and craving control and doesn't think they are helping.     DIETARY HISTORY  Meals Per Day: 3  Eating Protein First?: sometimes  Food Diary: B:cereal or eggs and turkey L:bean casserole or pot pie D:meat and vegetables and potatoes  Snacks Per Day: working on cutting down  Typical Snack: pudding cups, jello  Fluid Intake: not  "discussed  Portion Control: struggles  Calorie Containing Beverages: none    Positive Changes Since Last Visit: trying to decrease snacking  Struggling With: making healthy choices sometimes    Knowledgeable in Reading Food Labels: not sure  Getting Adequate Protein: sometimes      PHYSICAL ACTIVITY PATTERNS:  Exercise bike 6-10 minutes  miley Farmer     REVIEW OF SYSTEMS  GENERAL/CONSTITUTIONAL:  Fatigue: sometimes  HEENT:   glaucoma: no  CARDIOVASCULAR:  History of heart disease: no  GI:  Pancreatitis: no  NEUROLOGIC:  History of migraine headaches: history of  PSYCHIATRIC:  Moods: stable  ENDOCRINE:  Monitoring Blood Sugars: not really  Sugars Well Controlled: A1C has been stable  No personal or family history of medullary thyroid cancer no  :  Birth control: male  History of kidney stones: no     Patient Profile   Social History     Social History Narrative    Not on file        Past Medical History   Past Medical History:   Diagnosis Date    History of diabetes mellitus     pre-diabetic    Hypertension      Patient Active Problem List   Diagnosis    Benign hypertension    Chronic obstructive pulmonary disease (H)    Frequent falls    History of subdural hematoma (post traumatic)    Hyperlipidemia    Insomnia    Lower urinary tract symptoms due to benign prostatic hyperplasia    Nocturia    Obesity    Seasonal allergies    Sepsis (H)    Chronic kidney disease, stage 3a (H)    Morbid obesity (H)    Benign neoplasm of descending colon       Past Surgical History  He has no past surgical history on file.     Examination   Ht 1.803 m (5' 11\")   Wt 124.7 kg (275 lb)   BMI 38.35 kg/m    Wt Readings from Last 4 Encounters:   04/24/24 124.7 kg (275 lb)   01/03/24 123.8 kg (273 lb)   09/07/23 121.1 kg (267 lb)   06/05/23 119.3 kg (263 lb)      General:  Alert and ambulatory, NAD  Pscyh/Mood: stable         Counseling:   We reviewed the important post op bariatric recommendations:  -eating 3 meals " daily  -eating protein first, getting >60gm protein daily  -eating slowly, chewing food well  -avoiding/limiting calorie containing beverages  -limiting starchy vegetables and carbohydrates, choosing wheat, not white with breads,   crackers, pastas, óscar, bagels, tortillas, rice  -limiting restaurant or cafeteria eating to twice a week or less    We discussed the importance of restorative sleep and stress management in maintaining a healthy weight.  We discussed the National Weight Control Registry healthy weight maintenance strategies and ways to optimize metabolism.  We discussed the importance of physical activity including cardiovascular and strength training in maintaining a healthier weight.    Total time spent on the date of this encounter doing: chart review, review of test results, patient visit, physical exam, education, counseling, developing plan of care and documenting = 42 minutes.         DANE Arceo MD  [facility]

## 2024-04-24 NOTE — LETTER
4/24/2024         RE: Derek Harrington  [address] [place] [address]        Dear Colleague,    Thank you for referring your patient, Derek Harrington, to the [facility] SURGERY CL[facility] AND BARIATRICS CARE [place]. Please see a copy of my visit note below.    Bariatric Care Clinic Non Surgical Follow up Visit   Date of visit: 4/24/2024  Physician: DANE Arceo MD, MD  Primary Care is Antonio Lujan.  Derek Harrington   69 year old  male     Initial Weight: 270#  Initial BMI: 38.84  Today's Weight:   Wt Readings from Last 1 Encounters:   04/24/24 124.7 kg (275 lb)     Body mass index is 38.35 kg/m .           Assessment and Plan   Assessment: Derek is a 69 year old year old male who presents for medical weight management.      Plan:    1. Morbid obesity (H)  Patient was congratulated on his success thus far. Healthy habits to assist with further weight loss were discussed. He will try to keep up his exercise and increase his protein and vegetables. He will continue the metformin and topamax and he will try to increase the topamax to 100 mg. We discussed the patient's co-morbid conditions including hypertension and migraine headaches. These likely will improve with healthy habits and weight loss.     2. Benign hypertension  This may improve with healthy habits and weight loss.     3. History of migraine headaches  This may improve with healthy habits and weight loss.       4. Diabetes Mellitus  This may improve with healthy habits and weight loss. I will reach out to his primary MD. He is a candidate for ozempic but he would need to cut back on his insulin.     Follow up in 3 months with myself    Addendum: I left a message for his primary MD Dr. Antonio Lujan on 4/25/24. Will wait to hear from him before attempting to prescribe a GLP 1 agonist.       INTERIM HISTORY  Derek has been taking topamax and metformin for appetite and craving control and doesn't think they are helping.  "    DIETARY HISTORY  Meals Per Day: 3  Eating Protein First?: sometimes  Food Diary: B:cereal or eggs and turkey L:bean casserole or pot pie D:meat and vegetables and potatoes  Snacks Per Day: working on cutting down  Typical Snack: pudding cups, jello  Fluid Intake: not discussed  Portion Control: struggles  Calorie Containing Beverages: none    Positive Changes Since Last Visit: trying to decrease snacking  Struggling With: making healthy choices sometimes    Knowledgeable in Reading Food Labels: not sure  Getting Adequate Protein: sometimes      PHYSICAL ACTIVITY PATTERNS:  Exercise bike 6-10 minutes  miley Farmer     REVIEW OF SYSTEMS  GENERAL/CONSTITUTIONAL:  Fatigue: sometimes  HEENT:   glaucoma: no  CARDIOVASCULAR:  History of heart disease: no  GI:  Pancreatitis: no  NEUROLOGIC:  History of migraine headaches: history of  PSYCHIATRIC:  Moods: stable  ENDOCRINE:  Monitoring Blood Sugars: not really  Sugars Well Controlled: A1C has been stable  No personal or family history of medullary thyroid cancer no  :  Birth control: male  History of kidney stones: no     Patient Profile   Social History     Social History Narrative     Not on file        Past Medical History   Past Medical History:   Diagnosis Date     History of diabetes mellitus     pre-diabetic     Hypertension      Patient Active Problem List   Diagnosis     Benign hypertension     Chronic obstructive pulmonary disease (H)     Frequent falls     History of subdural hematoma (post traumatic)     Hyperlipidemia     Insomnia     Lower urinary tract symptoms due to benign prostatic hyperplasia     Nocturia     Obesity     Seasonal allergies     Sepsis (H)     Chronic kidney disease, stage 3a (H)     Morbid obesity (H)     Benign neoplasm of descending colon       Past Surgical History  He has no past surgical history on file.     Examination   Ht 1.803 m (5' 11\")   Wt 124.7 kg (275 lb)   BMI 38.35 kg/m    Wt Readings from Last 4 " Encounters:   04/24/24 124.7 kg (275 lb)   01/03/24 123.8 kg (273 lb)   09/07/23 121.1 kg (267 lb)   06/05/23 119.3 kg (263 lb)      General:  Alert and ambulatory, NAD  Pscyh/Mood: stable         Counseling:   We reviewed the important post op bariatric recommendations:  -eating 3 meals daily  -eating protein first, getting >60gm protein daily  -eating slowly, chewing food well  -avoiding/limiting calorie containing beverages  -limiting starchy vegetables and carbohydrates, choosing wheat, not white with breads,   crackers, pastas, óscar, bagels, tortillas, rice  -limiting restaurant or cafeteria eating to twice a week or less    We discussed the importance of restorative sleep and stress management in maintaining a healthy weight.  We discussed the National Weight Control Registry healthy weight maintenance strategies and ways to optimize metabolism.  We discussed the importance of physical activity including cardiovascular and strength training in maintaining a healthier weight.    Total time spent on the date of this encounter doing: chart review, review of test results, patient visit, physical exam, education, counseling, developing plan of care and documenting = 42 minutes.         DANE Arceo MD  [facility]           Virtual Visit Details    Type of service:  Telephone Visit   Phone call duration: 27 minutes   Originating Location (pt. Location): Home    Distant Location (provider location):  Off-site      Again, thank you for allowing me to participate in the care of your patient.        Sincerely,        DANE Arceo MD

## 2024-04-24 NOTE — PROGRESS NOTES
Virtual Visit Details    Type of service:  Telephone Visit   Phone call duration: 27 minutes   Originating Location (pt. Location): Home    Distant Location (provider location):  Off-site

## 2024-04-24 NOTE — NURSING NOTE
Is the patient currently in the state of [place]? YES    Visit mode:TELEPHONE    If the visit is dropped, the patient can be reconnected by: TELEPHONE VISIT: Phone number:   Telephone Information:   Mobile [phone]       Will anyone else be joining the visit? NO  (If patient encounters technical issues they should call [phone]    How would you like to obtain your AVS? Mail a copy    Are changes needed to the allergy or medication list? No    Are refills needed on medications prescribed by this physician? NO    Reason for visit: RECHJOSH CELIS

## 2024-05-01 ENCOUNTER — VIRTUAL VISIT (OUTPATIENT)
Dept: SURGERY | Facility: CLINIC | Age: 69
End: 2024-05-01
Payer: COMMERCIAL

## 2024-05-01 DIAGNOSIS — E66.9 OBESITY (BMI 30-39.9): Primary | ICD-10-CM

## 2024-05-01 DIAGNOSIS — Z71.3 NUTRITIONAL COUNSELING: ICD-10-CM

## 2024-05-01 DIAGNOSIS — E11.69 TYPE 2 DIABETES MELLITUS WITH OTHER SPECIFIED COMPLICATION, WITHOUT LONG-TERM CURRENT USE OF INSULIN (H): ICD-10-CM

## 2024-05-01 DIAGNOSIS — N18.31 CHRONIC KIDNEY DISEASE, STAGE 3A (H): ICD-10-CM

## 2024-05-01 PROCEDURE — 97803 MED NUTRITION INDIV SUBSEQ: CPT | Mod: 93

## 2024-05-01 NOTE — LETTER
5/1/2024         RE: Derek Harrington  [address] [place] [address]        Dear Colleague,    Thank you for referring your patient, Derek Harrington, to the [facility] SURGERY CLINIC AND BARIATRICS CARE [place]. Please see a copy of my visit note below.    Derek Harrington is a 69 year old who is being evaluated via a billable telephone       What phone number would you like to be contacted at? [phone]  How would you like to obtain your AVS? Mail a copy         Medical  Weight Loss Follow-Up Diet Evaluation  Assessment:  Derek is presenting today for a follow up weight management nutrition consultation.  This patient has had an initial appointment and was referred by Dr. Arceo for MNT as treatment for Obesity which is impacting his hypertension and hyperlipidemia.      Weight loss medication:  Topamax, Metformin  .   Pt's weight is 275 lbs  Initial weight: 271 lbs  Weight change: 4 lbs gain         4/24/2024     2:37 PM   Changes and Difficulties   I have made the following changes to my diet since my last visit: eating more greens, trying to eat yogurt more, and overall eat better   I have made the following changes to my activity/exercise since my last visit: none, still the same everyday workout     BMI: 38.08  Ideal body weight: 75.3 kg (166 lb 0.1 oz)  Adjusted ideal body weight: 93.6 kg (206 lb 6.5 oz)    Estimated RMR (A[place]-St Jeor equation):   1,830 kcals x 1.2 (sedentary) = 2,200 kcals (for weight maintenance)  Recommended Protein Intake: 70-80 grams of protein/day  Patient Active Problem List:  Patient Active Problem List   Diagnosis     Benign hypertension     Chronic obstructive pulmonary disease (H)     Frequent falls     History of subdural hematoma (post traumatic)     Hyperlipidemia     Insomnia     Lower urinary tract symptoms due to benign prostatic hyperplasia     Nocturia     Obesity     Seasonal allergies     Sepsis (H)     Chronic kidney disease, stage 3a (H)      Morbid obesity (H)     Benign neoplasm of descending colon     Diabetes: Yes, type 2   HbA1c: 5.6% 9/26/2023    Progress on goals from last visit: Patient reports he feels his nutrition I going well. Is emphasizing vegetables when he remembers. Continues excessive carbohydrate intake.  - Doing well at aiming to have  a vegetable with meals (lunch and dinner)  - working at prioritizing protein at meals (discussed some options)  - noted he is limiting waffle intake, choosing mini pancakes now.       Use My plate method when dishing up plate for portion control - not met   Choose high protein breakfast 4x per week at least - in progress  Stick to 1 servings of potatoes per day - not met   Increase vegetable intake aiming for 2 servings per day - met   Waffles, pancakes and hot cereal to maximum 1-2x per week - in progress        Dietary Recall:  Breakfast: x3 eggs with turkey patties and breakfast potatoes OR mini pancakes with syrup (6-10) OR cold cereal   Lunch: potatoes, with peas or green beans Or frozen meal Or noodles, pot pie   Dinner: home made chicken pot pie   Typical snacks: cheese puffs, candy, cheese sticks and crackers, cottage cheese   Beverages:   Diet sprite soda,   Water x3 16 oz glasses,     Exercise: exercise bike 8 min, weights, and PT workouts for 30 min 2x per day      Nutrition Diagnosis:    Obesity related to excessive energy intake as evidence by high carb based diet, large portions and BMI of 38.08 kg/m2          Intervention:  Food and/or nutrient delivery:   Consistency with meals, choosing sugar free beverages.   Controlled portion sizes.    Limiting carb intake to no more than 25% of plate  At least 3-4oz of meat protein with dinner and lunch   Nutrition counseling: continued support and goal setting  Encouraged patient to continue with great workout habits.    Monitoring/Evaluation:    Goals:  Limit candy intake (avoid purchasing for the house)  Continue prioritizing protein at  meals  Continue aiming to have at least 1 fruit or vegetables with each meal  Read labels- emphasizing portion sizes    Patient to follow up in 3.5 month(s) with bariatrician and 4 month(s) with RD    Handouts   none    Phone call duration: 29 minutes      Originating Location (pt. Location): Home      Distant Location (provider location):  On-site    Mode of Communication:  Video Conference via [facility]    Physician has received verbal consent for a Video Visit from the patient? Yes      Sena Costa RD           Again, thank you for allowing me to participate in the care of your patient.        Sincerely,        Sena Costa RD

## 2024-05-01 NOTE — PROGRESS NOTES
Derek Harrington is a 69 year old who is being evaluated via a billable telephone       What phone number would you like to be contacted at? [phone]  How would you like to obtain your AVS? Mail a copy         Medical  Weight Loss Follow-Up Diet Evaluation  Assessment:  Derek is presenting today for a follow up weight management nutrition consultation.  This patient has had an initial appointment and was referred by Dr. Arceo for MNT as treatment for Obesity which is impacting his hypertension and hyperlipidemia.      Weight loss medication:  Topamax, Metformin  .   Pt's weight is 275 lbs  Initial weight: 271 lbs  Weight change: 4 lbs gain         4/24/2024     2:37 PM   Changes and Difficulties   I have made the following changes to my diet since my last visit: eating more greens, trying to eat yogurt more, and overall eat better   I have made the following changes to my activity/exercise since my last visit: none, still the same everyday workout     BMI: 38.08  Ideal body weight: 75.3 kg (166 lb 0.1 oz)  Adjusted ideal body weight: 93.6 kg (206 lb 6.5 oz)    Estimated RMR ([place]-St Jeor equation):   1,830 kcals x 1.2 (sedentary) = 2,200 kcals (for weight maintenance)  Recommended Protein Intake: 70-80 grams of protein/day  Patient Active Problem List:  Patient Active Problem List   Diagnosis    Benign hypertension    Chronic obstructive pulmonary disease (H)    Frequent falls    History of subdural hematoma (post traumatic)    Hyperlipidemia    Insomnia    Lower urinary tract symptoms due to benign prostatic hyperplasia    Nocturia    Obesity    Seasonal allergies    Sepsis (H)    Chronic kidney disease, stage 3a (H)    Morbid obesity (H)    Benign neoplasm of descending colon     Diabetes: Yes, type 2   HbA1c: 5.6% 9/26/2023    Progress on goals from last visit: Patient reports he feels his nutrition I going well. Is emphasizing vegetables when he remembers. Continues excessive carbohydrate intake.  - Doing  well at aiming to have  a vegetable with meals (lunch and dinner)  - working at prioritizing protein at meals (discussed some options)  - noted he is limiting waffle intake, choosing mini pancakes now.       Use My plate method when dishing up plate for portion control - not met   Choose high protein breakfast 4x per week at least - in progress  Stick to 1 servings of potatoes per day - not met   Increase vegetable intake aiming for 2 servings per day - met   Waffles, pancakes and hot cereal to maximum 1-2x per week - in progress        Dietary Recall:  Breakfast: x3 eggs with turkey patties and breakfast potatoes OR mini pancakes with syrup (6-10) OR cold cereal   Lunch: potatoes, with peas or green beans Or frozen meal Or noodles, pot pie   Dinner: home made chicken pot pie   Typical snacks: cheese puffs, candy, cheese sticks and crackers, cottage cheese   Beverages:   Diet sprite soda,   Water x3 16 oz glasses,     Exercise: exercise bike 8 min, weights, and PT workouts for 30 min 2x per day      Nutrition Diagnosis:    Obesity related to excessive energy intake as evidence by high carb based diet, large portions and BMI of 38.08 kg/m2          Intervention:  Food and/or nutrient delivery:   Consistency with meals, choosing sugar free beverages.   Controlled portion sizes.    Limiting carb intake to no more than 25% of plate  At least 3-4oz of meat protein with dinner and lunch   Nutrition counseling: continued support and goal setting  Encouraged patient to continue with great workout habits.    Monitoring/Evaluation:    Goals:  Limit candy intake (avoid purchasing for the house)  Continue prioritizing protein at meals  Continue aiming to have at least 1 fruit or vegetables with each meal  Read labels- emphasizing portion sizes    Patient to follow up in 3 month(s) with bariatrician and 2 month(s) with RD    Handouts   none    Phone call duration: 29 minutes      Originating Location (pt. Location):  Home      Distant Location (provider location):  On-site    Mode of Communication:  Video Conference via [facility]    Physician has received verbal consent for a Video Visit from the patient? Yes      Sena Costa RD

## 2024-07-17 ENCOUNTER — VIRTUAL VISIT (OUTPATIENT)
Dept: SURGERY | Facility: CLINIC | Age: 69
End: 2024-07-17
Payer: COMMERCIAL

## 2024-07-17 VITALS — BODY MASS INDEX: 38.08 KG/M2 | WEIGHT: 273 LBS

## 2024-07-17 DIAGNOSIS — E66.9 OBESITY (BMI 30-39.9): Primary | ICD-10-CM

## 2024-07-17 DIAGNOSIS — E11.69 TYPE 2 DIABETES MELLITUS WITH OTHER SPECIFIED COMPLICATION, WITHOUT LONG-TERM CURRENT USE OF INSULIN (H): ICD-10-CM

## 2024-07-17 DIAGNOSIS — Z71.3 NUTRITIONAL COUNSELING: ICD-10-CM

## 2024-07-17 PROCEDURE — 97803 MED NUTRITION INDIV SUBSEQ: CPT | Mod: 93

## 2024-07-17 NOTE — PROGRESS NOTES
Derek Harrington is a 69 year old who is being evaluated via a billable telephone       What phone number would you like to be contacted at? [phone]  How would you like to obtain your AVS? Mail a copy         Medical  Weight Loss Follow-Up Diet Evaluation  Assessment:  Derek is presenting today for a follow up weight management nutrition consultation.  This patient has had an initial appointment and was referred by Dr. Arceo for MNT as treatment for Obesity which is impacting his hypertension and hyperlipidemia.      Weight loss medication:  Topamax, Metformin  .   Pt's weight is 273 lbs  Initial weight: 271 lbs  Weight change: no change int he past 2 months         4/24/2024     2:37 PM   Changes and Difficulties   I have made the following changes to my diet since my last visit: eating more greens, trying to eat yogurt more, and overall eat better   I have made the following changes to my activity/exercise since my last visit: none, still the same everyday workout     BMI: 38.08  Ideal body weight: 75.3 kg (166 lb 0.1 oz)  Adjusted ideal body weight: 93.6 kg (206 lb 6.5 oz)    Estimated RMR ([place]-St Jeor equation):   1,830 kcals x 1.2 (sedentary) = 2,200 kcals (for weight maintenance)  Recommended Protein Intake: 70-80 grams of protein/day  Patient Active Problem List:  Patient Active Problem List   Diagnosis    Benign hypertension    Chronic obstructive pulmonary disease (H)    Frequent falls    History of subdural hematoma (post traumatic)    Hyperlipidemia    Insomnia    Lower urinary tract symptoms due to benign prostatic hyperplasia    Nocturia    Obesity    Seasonal allergies    Sepsis (H)    Chronic kidney disease, stage 3a (H)    Morbid obesity (H)    Benign neoplasm of descending colon     Diabetes: Yes, type 2   HbA1c: 5.5% 6/20/2024    Progress on goals from last visit: Patient reports he has been increasing his protein. Continues to have candy, noted he is trying to limit buying it when he's in  "the grocery store. Patient reports he is working on not snacking in between meals.  - portion sizes tend to be biggest barrier  + A1c looks great      Limit candy intake (avoid purchasing for the house) - going well  Continue prioritizing protein at meals - in progress   Continue aiming to have at least 1 fruit or vegetables with each meal - \"trying\"  Read labels- emphasizing portion sizes - not met         Dietary Recall:  Breakfast: x3 eggs with turkey patties and breakfast potatoes  OR cold cereal Or pancakes, eggs and sausage   Lunch: potatoes, with peas or green beans Or frozen meal Or noodles, pot pie   Dinner: breaded chicken with peas and carrots with homemade bread   Typical snacks: crackers, cheese popcorn, cottage cheese   Beverages:   Diet sprite soda,   Water x3 16 oz glasses,   Exercise: exercise bike 8 min, weights, and PT workouts for 30 min 2x per day      Nutrition Diagnosis:    Obesity related to excessive energy intake as evidence by high carb based diet, large portions and BMI of 38.08 kg/m2          Intervention:  Food and/or nutrient delivery:   Consistency with meals, choosing sugar free beverages.   Controlled portion sizes.    Limiting carb intake to no more than 25% of plate  At least 4-5oz of meat protein with dinner and lunch   Nutrition counseling: continued support and goal setting  Encouraged patient to continue with great workout habits.    Monitoring/Evaluation:    Goals:  Continue reading nutriiton lables and practicing portion sizes  Follow myplate plate balance  Keep carb servings to 25% of meal     Patient to follow up in 3 month(s) with bariatrician and 2 month(s) with RD    Handouts   Carb info  Myplate info  High protein foods       Phone call duration: 20 minutes      Originating Location (pt. Location): Home      Distant Location (provider location):  On-site    Sena Hope RD         "

## 2024-07-17 NOTE — NURSING NOTE
Current patient location:  [facility]    Is the patient currently in the state [facility]? YES    Visit mode:TELEPHONE    If the visit is dropped, the patient can be reconnected by: TELEPHONE VISIT: Phone number: [phone]    Will anyone else be joining the visit? NO  (If patient encounters technical issues they should call [phone])    How would you like to obtain your AVS? Mail a copy    Are changes needed to the allergy or medication list? Pt stated no changes to allergies and Pt stated no med changes    Are refills needed on medications prescribed by this physician? NO    Reason for visit: RECHECK    Yoly CELIS

## 2024-07-17 NOTE — LETTER
7/17/2024      Derek Harrington  [address] [place] [address]      Dear Colleague,    Thank you for referring your patient, Derek Harrington, to the [facility] [place]. Please see a copy of my visit note below.    Derek Harrington is a 69 year old who is being evaluated via a billable telephone       What phone number would you like to be contacted at? [phone]  How would you like to obtain your AVS? Mail a copy         Medical  Weight Loss Follow-Up Diet Evaluation  Assessment:  Derek is presenting today for a follow up weight management nutrition consultation.  This patient has had an initial appointment and was referred by Dr. Arceo for MNT as treatment for Obesity which is impacting his hypertension and hyperlipidemia.      Weight loss medication:  Topamax, Metformin  .   Pt's weight is 273 lbs  Initial weight: 271 lbs  Weight change: no change int he past 2 months         4/24/2024     2:37 PM   Changes and Difficulties   I have made the following changes to my diet since my last visit: eating more greens, trying to eat yogurt more, and overall eat better   I have made the following changes to my activity/exercise since my last visit: none, still the same everyday workout     BMI: 38.08  Ideal body weight: 75.3 kg (166 lb 0.1 oz)  Adjusted ideal body weight: 93.6 kg (206 lb 6.5 oz)    Estimated RMR ([place]-St Jeor equation):   1,830 kcals x 1.2 (sedentary) = 2,200 kcals (for weight maintenance)  Recommended Protein Intake: 70-80 grams of protein/day  Patient Active Problem List:  Patient Active Problem List   Diagnosis     Benign hypertension     Chronic obstructive pulmonary disease (H)     Frequent falls     History of subdural hematoma (post traumatic)     Hyperlipidemia     Insomnia     Lower urinary tract symptoms due to benign prostatic hyperplasia     Nocturia     Obesity     Seasonal allergies     Sepsis (H)     Chronic kidney disease, stage 3a (H)  "    Morbid obesity (H)     Benign neoplasm of descending colon     Diabetes: Yes, type 2   HbA1c: 5.5% 6/20/2024    Progress on goals from last visit: Patient reports he has been increasing his protein. Continues to have candy, noted he is trying to limit buying it when he's in the grocery store. Patient reports he is working on not snacking in between meals.  - portion sizes tend to be biggest barrier  + A1c looks great      Limit candy intake (avoid purchasing for the house) - going well  Continue prioritizing protein at meals - in progress   Continue aiming to have at least 1 fruit or vegetables with each meal - \"trying\"  Read labels- emphasizing portion sizes - not met         Dietary Recall:  Breakfast: x3 eggs with turkey patties and breakfast potatoes  OR cold cereal Or pancakes, eggs and sausage   Lunch: potatoes, with peas or green beans Or frozen meal Or noodles, pot pie   Dinner: breaded chicken with peas and carrots with homemade bread   Typical snacks: crackers, cheese popcorn, cottage cheese   Beverages:   Diet sprite soda,   Water x3 16 oz glasses,   Exercise: exercise bike 8 min, weights, and PT workouts for 30 min 2x per day      Nutrition Diagnosis:    Obesity related to excessive energy intake as evidence by high carb based diet, large portions and BMI of 38.08 kg/m2          Intervention:  Food and/or nutrient delivery:   Consistency with meals, choosing sugar free beverages.   Controlled portion sizes.    Limiting carb intake to no more than 25% of plate  At least 4-5oz of meat protein with dinner and lunch   Nutrition counseling: continued support and goal setting  Encouraged patient to continue with great workout habits.    Monitoring/Evaluation:    Goals:  Continue reading nutriiton lables and practicing portion sizes  Follow myplate plate balance  Keep carb servings to 25% of meal     Patient to follow up in 3 month(s) with bariatrician and 2 month(s) with RD    Handouts   Carb info  Myplate " info  High protein foods       Phone call duration: 20 minutes      Originating Location (pt. Location): Home      Distant Location (provider location):  On-site    Sena Hope RD             Again, thank you for allowing me to participate in the care of your patient.        Sincerely,        Sena Hope RD

## 2024-08-01 NOTE — PROGRESS NOTES
Virtual Visit Details    Type of service:  Telephone Visit   Phone call duration: 23 minutes   Originating Location (pt. Location): Home    Distant Location (provider location):  Off-site    [facility] Non Surgical Follow up Visit   Date of visit: 8/14/2024  Physician: DANE Arceo MD, MD  Primary Care is Antonio Lujan.  Derek Harrington   69 year old  male     Initial Weight: 270#  Initial BMI: 38.84  Today's Weight:   Wt Readings from Last 1 Encounters:   08/14/24 124.7 kg (275 lb)     Body mass index is 38.35 kg/m .           Assessment and Plan   Assessment: Derek is a 69 year old year old male who presents for medical weight management.      Plan:    1. Morbid obesity (H)  Patient was congratulated on his success thus far. Healthy habits to assist with further weight loss were discussed. He will try to get unhealthy snacks out of the house and will continue to work on controlling portions of starch. He will continue his exercise and will continue the topamax. We could try adding 1/2 strength phentermine but he declined for now. He is also a candidate for ozempic but I do have some worries that he would struggle with low BS. We discussed the patient's co-morbid conditions including hypertension and DM. These likely will improve with healthy habits and weight loss.     2. Benign hypertension  This may improve with healthy habits and weight loss.     3. Type 2 diabetes mellitus with other specified complication, without long-term current use of insulin (H)  This may improve with healthy habits and weight loss.      Follow up in 1 month with the dietician and in 3 months with myself            INTERIM HISTORY  Patient tried increasing his topamax dose to 100 mg after his last appointment in April. He is not sure it is helping with appetite control. He feels his biggest issue is snacking at night.    Goals set withdietifian 7/17/24:  Continue reading nutriiton lables and practicing portion  sizes- improving  Follow myplate plate balance- not met  Keep carb servings to 25% of meal - met partially    DIETARY HISTORY  Meals Per Day: 3  Eating Protein First?: sometimes  Food Diary: B:raison bran L:pot roast with carrots and peas and beans D:meat and vegetables or salad and starch   Snacks Per Day: evenings  Typical Snack: candy, twinkies- trying to have fig newtons now instead  Fluid Intake: 64 oz  Portion Control: improved  Calorie Containing Beverages: rare malt    Meals at Restaurant per week:0-1    Positive Changes Since Last Visit: decreased portion sizes, working on healthier snacking  Struggling With: snacking at night    Knowledgeable in Reading Food Labels: not sure  Getting Adequate Protein: sometimes    PHYSICAL ACTIVITY PATTERNS:  Stationary biking, lifting weights- 30 minutes twice a day 4-5 days per week    REVIEW OF SYSTEMS  GENERAL/CONSTITUTIONAL:  Fatigue: sometimes  HEENT:   glaucoma: no  CARDIOVASCULAR:  History of heart disease: no  GI:  Pancreatitis: no  PSYCHIATRIC:  Moods: stable  ENDOCRINE:  Monitoring Blood Sugars: yes-has a dexcom  Sugars Well Controlled: generally yes, he recently decreased his lantus to 40 international unit(s), he is no longer having any  No personal or family history of medullary thyroid cancer no  :  Birth control: male  History of kidney stones: no     Patient Profile   Social History     Social History Narrative    Not on file        Past Medical History   Past Medical History:   Diagnosis Date    History of diabetes mellitus     pre-diabetic    Hypertension      Patient Active Problem List   Diagnosis    Benign hypertension    Chronic obstructive pulmonary disease (H)    Frequent falls    History of subdural hematoma (post traumatic)    Hyperlipidemia    Insomnia    Lower urinary tract symptoms due to benign prostatic hyperplasia    Nocturia    Obesity    Seasonal allergies    Sepsis (H)    Chronic kidney disease, stage 3a (H)    Morbid obesity (H)     Benign neoplasm of descending colon       Past Surgical History  He has no past surgical history on file.     Examination   Wt 124.7 kg (275 lb)   BMI 38.35 kg/m    Wt Readings from Last 4 Encounters:   08/14/24 124.7 kg (275 lb)   07/17/24 123.8 kg (273 lb)   04/24/24 124.7 kg (275 lb)   01/03/24 123.8 kg (273 lb)      BP Readings from Last 3 Encounters:   03/26/23 136/69   09/20/22 132/82   06/14/22 130/82      GENERAL: alert and no distress  EYES: Eyes grossly normal to inspection.  No discharge or erythema, or obvious scleral/conjunctival abnormalities.  RESP: No audible wheeze, cough, or visible cyanosis.    SKIN: Visible skin clear. No significant rash, abnormal pigmentation or lesions.  NEURO: Cranial nerves grossly intact.  Mentation and speech appropriate for age.  PSYCH: Appropriate affect, tone, and pace of words      Counseling:   We reviewed the important post op bariatric recommendations:  -eating 3 meals daily  -eating protein first, getting >60gm protein daily  -eating slowly, chewing food well  -avoiding/limiting calorie containing beverages  -limiting starchy vegetables and carbohydrates, choosing wheat, not white with breads,   crackers, pastas, óscar, bagels, tortillas, rice  -limiting restaurant or cafeteria eating to twice a week or less    We discussed the importance of restorative sleep and stress management in maintaining a healthy weight.  We discussed the National Weight Control Registry healthy weight maintenance strategies and ways to optimize metabolism.  We discussed the importance of physical activity including cardiovascular and strength training in maintaining a healthier weight.    Total time spent on the date of this encounter doing: chart review, review of test results, patient visit, physical exam, education, counseling, developing plan of care and documenting = 36 minutes.         DANE Arceo MD  [facility]

## 2024-08-14 ENCOUNTER — VIRTUAL VISIT (OUTPATIENT)
Dept: SURGERY | Facility: CLINIC | Age: 69
End: 2024-08-14
Payer: COMMERCIAL

## 2024-08-14 VITALS — HEIGHT: 71 IN | WEIGHT: 275 LBS | BODY MASS INDEX: 38.5 KG/M2

## 2024-08-14 DIAGNOSIS — E66.01 MORBID OBESITY (H): Primary | ICD-10-CM

## 2024-08-14 DIAGNOSIS — E11.69 TYPE 2 DIABETES MELLITUS WITH OTHER SPECIFIED COMPLICATION, WITHOUT LONG-TERM CURRENT USE OF INSULIN (H): ICD-10-CM

## 2024-08-14 DIAGNOSIS — I10 BENIGN HYPERTENSION: ICD-10-CM

## 2024-08-14 PROCEDURE — 99443 PR PHYSICIAN TELEPHONE EVALUATION 21-30 MIN: CPT | Mod: 93 | Performed by: FAMILY MEDICINE

## 2024-08-14 ASSESSMENT — PAIN SCALES - GENERAL: PAINLEVEL: NO PAIN (0)

## 2024-08-14 NOTE — LETTER
8/14/2024      Derek Harrington  [address] [place] [address]      Dear Colleague,    Thank you for referring your patient, Derek Harrington, to the [facility] [place]. Please see a copy of my visit note below.    Virtual Visit Details    Type of service:  Telephone Visit   Phone call duration: 23 minutes   Originating Location (pt. Location): Home    Distant Location (provider location):  Off-site    Bariatric Care Clinic Non Surgical Follow up Visit   Date of visit: 8/14/2024  Physician: DANE Arceo MD, MD  Primary Care is Antonio Lujan.  Derek Harrington   69 year old  male     Initial Weight: 270#  Initial BMI: 38.84  Today's Weight:   Wt Readings from Last 1 Encounters:   08/14/24 124.7 kg (275 lb)     Body mass index is 38.35 kg/m .           Assessment and Plan   Assessment: Derek is a 69 year old year old male who presents for medical weight management.      Plan:    1. Morbid obesity (H)  Patient was congratulated on his success thus far. Healthy habits to assist with further weight loss were discussed. He will try to get unhealthy snacks out of the house and will continue to work on controlling portions of starch. He will continue his exercise and will continue the topamax. We could try adding 1/2 strength phentermine but he declined for now. He is also a candidate for ozempic but I do have some worries that he would struggle with low BS. We discussed the patient's co-morbid conditions including hypertension and DM. These likely will improve with healthy habits and weight loss.     2. Benign hypertension  This may improve with healthy habits and weight loss.     3. Type 2 diabetes mellitus with other specified complication, without long-term current use of insulin (H)  This may improve with healthy habits and weight loss.      Follow up in 1 month with the dietician and in 3 months with myself            INTERIM HISTORY  Patient tried  increasing his topamax dose to 100 mg after his last appointment in April. He is not sure it is helping with appetite control. He feels his biggest issue is snacking at night.    Goals set withdietifian 7/17/24:  Continue reading nutriiton lables and practicing portion sizes- improving  Follow myplate plate balance- not met  Keep carb servings to 25% of meal - met partially    DIETARY HISTORY  Meals Per Day: 3  Eating Protein First?: sometimes  Food Diary: B:raison bran L:pot roast with carrots and peas and beans D:meat and vegetables or salad and starch   Snacks Per Day: evenings  Typical Snack: candy, twinkies- trying to have fig newtons now instead  Fluid Intake: 64 oz  Portion Control: improved  Calorie Containing Beverages: rare malt    Meals at Restaurant per week:0-1    Positive Changes Since Last Visit: decreased portion sizes, working on healthier snacking  Struggling With: snacking at night    Knowledgeable in Reading Food Labels: not sure  Getting Adequate Protein: sometimes    PHYSICAL ACTIVITY PATTERNS:  Stationary biking, lifting weights- 30 minutes twice a day 4-5 days per week    REVIEW OF SYSTEMS  GENERAL/CONSTITUTIONAL:  Fatigue: sometimes  HEENT:   glaucoma: no  CARDIOVASCULAR:  History of heart disease: no  GI:  Pancreatitis: no  PSYCHIATRIC:  Moods: stable  ENDOCRINE:  Monitoring Blood Sugars: yes-has a dexcom  Sugars Well Controlled: generally yes, he recently decreased his lantus to 40 international unit(s), he is no longer having any  No personal or family history of medullary thyroid cancer no  :  Birth control: male  History of kidney stones: no     Patient Profile   Social History     Social History Narrative     Not on file        Past Medical History   Past Medical History:   Diagnosis Date     History of diabetes mellitus     pre-diabetic     Hypertension      Patient Active Problem List   Diagnosis     Benign hypertension     Chronic obstructive pulmonary disease (H)     Frequent  falls     History of subdural hematoma (post traumatic)     Hyperlipidemia     Insomnia     Lower urinary tract symptoms due to benign prostatic hyperplasia     Nocturia     Obesity     Seasonal allergies     Sepsis (H)     Chronic kidney disease, stage 3a (H)     Morbid obesity (H)     Benign neoplasm of descending colon       Past Surgical History  He has no past surgical history on file.     Examination   Wt 124.7 kg (275 lb)   BMI 38.35 kg/m    Wt Readings from Last 4 Encounters:   08/14/24 124.7 kg (275 lb)   07/17/24 123.8 kg (273 lb)   04/24/24 124.7 kg (275 lb)   01/03/24 123.8 kg (273 lb)      BP Readings from Last 3 Encounters:   03/26/23 136/69   09/20/22 132/82   06/14/22 130/82      GENERAL: alert and no distress  EYES: Eyes grossly normal to inspection.  No discharge or erythema, or obvious scleral/conjunctival abnormalities.  RESP: No audible wheeze, cough, or visible cyanosis.    SKIN: Visible skin clear. No significant rash, abnormal pigmentation or lesions.  NEURO: Cranial nerves grossly intact.  Mentation and speech appropriate for age.  PSYCH: Appropriate affect, tone, and pace of words      Counseling:   We reviewed the important post op bariatric recommendations:  -eating 3 meals daily  -eating protein first, getting >60gm protein daily  -eating slowly, chewing food well  -avoiding/limiting calorie containing beverages  -limiting starchy vegetables and carbohydrates, choosing wheat, not white with breads,   crackers, pastas, óscar, bagels, tortillas, rice  -limiting restaurant or cafeteria eating to twice a week or less    We discussed the importance of restorative sleep and stress management in maintaining a healthy weight.  We discussed the National Weight Control Registry healthy weight maintenance strategies and ways to optimize metabolism.  We discussed the importance of physical activity including cardiovascular and strength training in maintaining a healthier weight.    Total time spent  on the date of this encounter doing: chart review, review of test results, patient visit, physical exam, education, counseling, developing plan of care and documenting = 36 minutes.         DANE Arceo MD  [facility]               Again, thank you for allowing me to participate in the care of your patient.        Sincerely,        DANE Arceo MD

## 2024-08-14 NOTE — NURSING NOTE
Current patient location: [address] [place] [address]    Is the patient currently in the state of [place]? YES    Visit mode:TELEPHONE    If the visit is dropped, the patient can be reconnected by: TELEPHONE VISIT: Phone number:   No relevant phone numbers on file.       Will anyone else be joining the visit? NO  (If patient encounters technical issues they should call [phone])    How would you like to obtain your AVS? Mail a copy    Are changes needed to the allergy or medication list? No, Pt stated no changes to allergies, and Pt stated no med changes    Are refills needed on medications prescribed by this physician? NO    Rooming Documentation:  Assigned questionnaire(s) completed      Reason for visit: RECHECK (DARÍO)    Jessenia CELIS

## 2024-08-14 NOTE — PATIENT INSTRUCTIONS
Eat Better ? Move More ? Live Well    Eat 3 nutrient-rich meals each day    Don t skip meals--it will cause you to overeat later in the day!    Eating fiber (vegetables/fruits/whole grains) and protein with meals helps you stay full longer    Choose foods with less than 10 grams of sugar and 5 grams of fat per serving to prevent excess calories and weight gain  Eat around the same times each day to develop a routine eating schedule   Avoid snacking unless physically hungry.   Planned snacks: 1-2 times per day and no more than 150 calories    Eat protein first   Protein helps with healing, maintaining adequate muscle mass, reducing hunger and optimizing nutritional status   Aim for 60-80 grams of protein per day   Fill up on Fiber   Fiber comes from plants--fruits, veggies, whole grains, nuts/seeds and beans   Fiber is low in calories, high in phytonutrients and helps you stay full longer   Aim for 25-35 grams per day by eating fiber with meals and snacks  Eat [id number]   Take 20-30 minutes to eat each meal by taking small bites, chewing foods to applesauce consistency or 20-30 times before you swallow   Eating foods too fast can delay satiety/fullness signals and increase overeating   Slow down your eating by using toddler utensils, putting your fork/spoon down between bites and not watching TV or emailing during meals!   Keep a Journal         Writing down what you eat, how you feel and when you are active helps you identify new changes to work on from week to week         Look for ways to cut 100 calories from your current diet 2-3 times per day  Drink 64 ounces of 0-Calorie drinks between meals   Water   Zero calorie Propel  or Vitamin Water     [employer]  Zero Calories   Crystal Light , Sugar-Free Lex-Aid , and other sugar-free lemonade or flavored brown   Keep Caffeine to less than 300mg per day ie: 3-6oz cups coffee     Work up to 45-60 minutes of physical activity most days of the week   Helps with  losing weight and prevent regaining those extra pounds!    Do a combo of cardio (walking/water exercises) and strength training (lifting weights/Vinyasa yoga)    Avoid Mindless Eating   Be present when you eat--take note of the smell, taste and quality of your food   Make a list of alternative activities you could do to prevent eating out of boredom/stress  Go for a walk, call a friend, chew gum, paint your nails, re-organize the garage, etc         PHENTERMINE    We are considering starting Phentermine. Take 1/2 tablet in the morning. You may increase to a full tablet daily after one week if tolerated. You can stay on 1/2 pill if it seems to work to decrease your appetite. If you forget to take it in the morning, skip it for the day as taking it too late can interfere with sleep.Contact the nurse via [employer] or call [phone] if you have any questions or concerns. (Do not stop taking it if you don't think it's working. For some people it works without them knowing it.)    Phentermine is being prescribed because you identified hunger as one of the main causes for your extra weight.      Our patients on Phentermine find that they:    >feel less hunger    >find it easier to push the plate away   >have an easier time eating less              >often find they don't focus on food all day long    For some of our patients, these feelings are very real and immediate. For other patients, the feelings are less obvious. They don't feel much of a change but find they've lost weight. Like all weight loss medications, Phentermine  works best when you help it work. This means:  1. Having less tempting high calorie (fattening) food around the house or office. (For people with strong cravings this is very important.)   2. Staying away from situations or people that may trigger your cravings .   3. Eating out only one time or less each week.  4. Eating your meals at a table with the TV or computer off.    Side-effects. Phentermine  is generally well tolerated. The main side-effects we see are feelings of racing pulse or rapid heart beat. Some people can get an elevated blood pressure. It could also possibly affect your sleep. Most people will experience a dry mouth but that is usually not too bothersome. Please limit or eliminate caffeine while taking this medication as it can make you feel too jittery.    If your insurance doesn't cover it you can pay out of pocket for it with a Good rx Coupon. Best prices are at [facility] and [facility]. It will cost you about $30 for a 3 month supply at these pharmacies.        In order to get refills of this or any medication we prescribe you must be seen in the medical weight mgmt clinic every 2-3 months.  It is a stimulant and therefore it is a controlled substance. You will not get addicted to it but generally will develop a tolerance, eventually making it ineffective. This can happen in a matter of months or years. We will revisit this at each follow up visit.        Anais

## 2024-11-19 ENCOUNTER — LAB REQUISITION (OUTPATIENT)
Dept: LAB | Facility: CLINIC | Age: 69
End: 2024-11-19

## 2024-11-19 DIAGNOSIS — E78.5 HYPERLIPIDEMIA, UNSPECIFIED: ICD-10-CM

## 2024-11-19 DIAGNOSIS — E11.22 TYPE 2 DIABETES MELLITUS WITH DIABETIC CHRONIC KIDNEY DISEASE (H): ICD-10-CM

## 2024-11-19 PROCEDURE — 82465 ASSAY BLD/SERUM CHOLESTEROL: CPT | Performed by: FAMILY MEDICINE

## 2024-11-19 PROCEDURE — 82435 ASSAY OF BLOOD CHLORIDE: CPT | Performed by: FAMILY MEDICINE

## 2024-11-19 PROCEDURE — 82565 ASSAY OF CREATININE: CPT | Performed by: FAMILY MEDICINE

## 2024-11-19 PROCEDURE — 80061 LIPID PANEL: CPT | Performed by: FAMILY MEDICINE

## 2024-11-19 PROCEDURE — 80048 BASIC METABOLIC PNL TOTAL CA: CPT | Performed by: FAMILY MEDICINE

## 2024-11-19 PROCEDURE — 82043 UR ALBUMIN QUANTITATIVE: CPT | Performed by: FAMILY MEDICINE

## 2024-11-20 LAB
ANION GAP SERPL CALCULATED.3IONS-SCNC: 18 MMOL/L (ref 7–15)
BUN SERPL-MCNC: 25.4 MG/DL (ref 8–23)
CALCIUM SERPL-MCNC: 9.8 MG/DL (ref 8.8–10.4)
CHLORIDE SERPL-SCNC: 101 MMOL/L (ref 98–107)
CHOLEST SERPL-MCNC: 155 MG/DL
CREAT SERPL-MCNC: 1.3 MG/DL (ref 0.67–1.17)
CREAT UR-MCNC: 145 MG/DL
EGFRCR SERPLBLD CKD-EPI 2021: 60 ML/MIN/1.73M2
FASTING STATUS PATIENT QL REPORTED: NO
FASTING STATUS PATIENT QL REPORTED: NO
GLUCOSE SERPL-MCNC: 118 MG/DL (ref 70–99)
HCO3 SERPL-SCNC: 20 MMOL/L (ref 22–29)
HDLC SERPL-MCNC: 44 MG/DL
LDLC SERPL CALC-MCNC: 50 MG/DL
MICROALBUMIN UR-MCNC: 17.1 MG/L
MICROALBUMIN/CREAT UR: 11.79 MG/G CR (ref 0–17)
NONHDLC SERPL-MCNC: 111 MG/DL
POTASSIUM SERPL-SCNC: 4.3 MMOL/L (ref 3.4–5.3)
SODIUM SERPL-SCNC: 139 MMOL/L (ref 135–145)
TRIGL SERPL-MCNC: 307 MG/DL

## 2024-12-04 ENCOUNTER — VIRTUAL VISIT (OUTPATIENT)
Dept: SURGERY | Facility: CLINIC | Age: 69
End: 2024-12-04
Payer: COMMERCIAL

## 2024-12-04 DIAGNOSIS — E66.9 OBESITY (BMI 30-39.9): Primary | ICD-10-CM

## 2024-12-04 DIAGNOSIS — E11.69 TYPE 2 DIABETES MELLITUS WITH OTHER SPECIFIED COMPLICATION, WITHOUT LONG-TERM CURRENT USE OF INSULIN (H): ICD-10-CM

## 2024-12-04 DIAGNOSIS — Z71.3 NUTRITIONAL COUNSELING: ICD-10-CM

## 2024-12-04 PROCEDURE — 97803 MED NUTRITION INDIV SUBSEQ: CPT | Mod: 93

## 2024-12-04 NOTE — PATIENT INSTRUCTIONS
Patient Education   Learning About Meal Planning for Diabetes  Why plan your meals?     Meal planning can be a key part of managing diabetes. Planning meals and snacks with the right balance of carbohydrate, protein, and fat can help you keep your blood sugar at the target level you set with your doctor.  You don't have to eat special foods. You can eat what your family eats, including sweets once in a while. But you do have to pay attention to how often you eat and how much you eat of certain foods.  You may want to work with a dietitian or a diabetes educator. They can give you tips and meal ideas and can answer your questions about meal planning. This health professional can also help you reach a healthy weight if that is one of your goals.  What plan is right for you?  Your dietitian or diabetes educator may suggest that you start with the plate method or carbohydrate counting.  The plate method  The plate method is an easy way to plan meals. You divide your plate into sections for vegetables, proteins, and carbohydrates. This can help you manage the amount of carbohydrate you eat. It can make it easier to keep your blood sugar level within your target range.  To use the plate method, put non-starchy vegetables on half your plate. These include vegetables like broccoli, carrots, cauliflower, green beans, mushrooms, peppers, salad greens, and tomatoes. Add lean protein foods--such as lean meats and poultry, fish, tofu, nuts, and eggs-- on a fourth of the plate. Put carbohydrate foods--such as grains, fruit, yogurt and milk, and starchy vegetables like potatoes, corn, and beans--on the final fourth of the plate. Choose water or another low-calorie beverage to drink with your meal.  Here are some tips for using the plate method:  Make sure that you are not using an oversized plate. A 9-inch plate is best.  Get used to using the plate method at home. Then you can use it when you eat out. Keep in mind that many  restaurants use larger plates, so you may need to adjust your portions.  Write down your questions about using the plate method. Talk to your doctor, a dietitian, or a diabetes educator about your concerns.  Carbohydrate counting  With carbohydrate counting, you plan meals based on the amount of carbohydrate in each food. Carbohydrate raises blood sugar higher and more quickly than any other nutrient. It is found in desserts, breads and cereals, and fruit. It's also found in starchy vegetables such as potatoes and corn, grains such as rice and pasta, and milk and yogurt. You can help keep your blood sugar levels within your target range by planning how much carbohydrate to have at meals and snacks.  The amount you need depends on several things. These include your weight, how active you are, which diabetes medicines you take, and what your goals are for your blood sugar levels. A registered dietitian or diabetes educator can help you plan how much carbohydrate to include in each meal and snack.  An example of a carbohydrate counting plan is:  45 to 60 grams at each meal. That's about the same as 3 to 4 carbohydrate servings.  15 to 20 grams at each snack. That's about the same as 1 carbohydrate serving.  The Nutrition Facts label on packaged foods tells you how much carbohydrate is in a serving of the food. First, look at the serving size on the food label. Is that the amount you eat in a serving? All of the nutrition information on a food label is based on that serving size. So if you eat more or less than that, you'll need to adjust the other numbers. Total carbohydrate is the next thing you need to look for on the label. If you count carbohydrate servings, one serving of carbohydrate is 15 grams.  For foods that don't come with labels, such as fresh fruits and vegetables, you'll need a guide that lists carbohydrate in these foods. Ask your doctor, dietitian, or diabetes educator about books or other nutrition  "guides you can use.  If you take insulin, you need to know how many grams of carbohydrate are in a meal. This lets you know how much rapid-acting insulin to take before you eat. If you use an insulin pump, you get a constant rate of insulin during the day. So the pump must be programmed at meals to give you extra insulin to cover the rise in blood sugar after meals.  When you know how much carbohydrate you will eat, you can take the right amount of insulin. Or, if you always use the same amount of insulin, you need to make sure that you eat the same amount of carbohydrate at meals.  If you need more help to understand carbohydrate counting and food labels, ask your doctor, dietitian, or diabetes educator.  How can you plan healthy meals?  Here are some tips to get started:  Plan your meals a week at a time. Don't forget to include snacks too.  Use cookbooks or online recipes to plan several main meals. Plan some quick meals for busy nights. You also can double some recipes that freeze well. Then you can save half for other busy nights when you don't have time to cook.  Make sure you have the ingredients you need for your recipes. If you're running low on basic items, put these items on your shopping list too.  List foods that you use to make breakfasts, lunches, and snacks. List plenty of fruits and vegetables.  Post this list on the refrigerator. Add to it as you think of more things you need.  Take the list to the store to do your weekly shopping.  Your doctor or diabetes educator can tell you about resources that can help you get the food you need. Make sure to talk to them if either of the next two statements have ever been \"true\" or \"sometimes true.\"  \"Within the past 12 months, we worried whether our food would run out before we got money to buy more.\"  \"Within the past 12 months, the food we bought just didn't last, and we didn't have money to get more.\"  Follow-up care is a key part of your treatment and " "safety. Be sure to make and go to all appointments, and call your doctor if you are having problems. It's also a good idea to know your test results and keep a list of the medicines you take.  Where can you learn more?  Go to https://www.[employer]net/patiented  Enter X936 in the search box to learn more about \"Learning About Meal Planning for Diabetes.\"  Current as of: September 20, 2023  Content Version: 14.2 2024 [facility] [employer]Notebook [facility].   Care instructions adapted under license by your healthcare professional. If you have questions about a medical condition or this instruction, always ask your healthcare professional. [employer] disclaims any warranty or liability for your use of this information.       "

## 2024-12-04 NOTE — LETTER
12/4/2024      Derek Harrington  [address] [place] [address]      Dear Colleague,    Thank you for referring your patient, Derek Harrington, to the [facility] [place]. Please see a copy of my visit note below.    Derek Harrington is a 69 year old who is being evaluated via a billable telephone       What phone number would you like to be contacted at? [phone]  How would you like to obtain your AVS? Mail a copy         Medical  Weight Loss Follow-Up Diet Evaluation  Assessment:  Derek is presenting today for a follow up weight management nutrition consultation.  This patient has had an initial appointment and was referred by Dr. Arceo for MNT as treatment for Obesity which is impacting his hypertension and hyperlipidemia.      Weight loss medication:  Topamax, Metformin  .   Pt's weight is 276 lbs  Initial weight: 271 lbs  Weight change: no change int he past 2 months         8/14/2024     1:09 PM   Changes and Difficulties   I have made the following changes to my diet since my last visit: None   With regards to my diet, I am still struggling with: Trying to eat more veggies, trying to eat more dairy   I have made the following changes to my activity/exercise since my last visit: Working out- two 30 min sessions per day biking, weights   With regards to my activity/exercise, I am still struggling with: None     BMI: 38.08  Ideal body weight: 75.3 kg (166 lb 0.1 oz)  Adjusted ideal body weight: 93.6 kg (206 lb 6.5 oz)    Estimated RMR (Gerald-St Jeor equation):   1,830 kcals x 1.2 (sedentary) = 2,200 kcals (for weight maintenance)  Recommended Protein Intake: 70-80 grams of protein/day  Patient Active Problem List:  Patient Active Problem List   Diagnosis     Benign hypertension     Chronic obstructive pulmonary disease (H)     Frequent falls     History of subdural hematoma (post traumatic)     Hyperlipidemia     Insomnia     Lower urinary tract symptoms due  to benign prostatic hyperplasia     Nocturia     Obesity     Seasonal allergies     Sepsis (H)     Chronic kidney disease, stage 3a (H)     Morbid obesity (H)     Benign neoplasm of descending colon     Diabetes: Yes, type 2   HbA1c: 5.8%  (9/09/2024)    Progress on goals from last visit: Patient continues to work on using the plate method in regards to carbohydrates. Patient is working on portion control and eating smaller servings. Noted he is drinking more 1% milk d/t having low blood sugars for the last week- having them happen in the middle of the night. Eating 1 fruit or vegetables daily     Continue reading nutriiton lables and practicing portion sizes  Follow myplate plate balance  Keep carb servings to 25% of meal       Dietary Recall:  Breakfast: x3 eggs with turkey patties and breakfast potatoes  OR cold cereal Or pancakes, eggs and sausage   Lunch: potatoes, with peas or green beans Or frozen meal Or noodles, pot pie   Dinner: breaded chicken with peas and carrots with homemade bread   Typical snacks: crackers, cheese popcorn, cottage cheese   Beverages:   Diet sprite soda,   1% milk   Water x3 16 oz glasses,   Exercise: exercise bike 8 min, weights, and PT workouts for 30 min 2x per day      Nutrition Diagnosis:    Obesity related to excessive energy intake as evidence by high carb based diet, large portions and BMI of 38.08 kg/m2          Intervention:  Food and/or nutrient delivery:   Consistency with meals, choosing sugar free beverages.   Controlled portion sizes.    Limiting carb intake to no more than 25% of plate  At least 4-5oz of meat protein with dinner and lunch   Increasing fruit and vegetables daily  Nutrition counseling: continued support and goal setting  Encouraged patient to continue with great workout habits.    Monitoring/Evaluation:    Goals:  Continue reading nutriiton lables and practicing portion sizes  Increase fruit or vegetable servings to target at least 2 servings today  Keep  carb servings to 25% of meal   Try having greek yogurt serving with berries as a bedtime snack to avoid low blood sugars in the evening    Patient to follow up in 3 month(s) with bariatrician and 3 month(s) with RD    Handouts   Myplate method and diabetes management       Phone call duration: 16 minutes      Originating Location (pt. Location): Home      Distant Location (provider location):  On-site    Sena Hope RD             Again, thank you for allowing me to participate in the care of your patient.        Sincerely,        Sena Hope RD

## 2024-12-04 NOTE — PROGRESS NOTES
Derek Harrington is a 69 year old who is being evaluated via a billable telephone       What phone number would you like to be contacted at? [phone]  How would you like to obtain your AVS? Mail a copy         Medical  Weight Loss Follow-Up Diet Evaluation  Assessment:  Derek is presenting today for a follow up weight management nutrition consultation.  This patient has had an initial appointment and was referred by Dr. Arceo for MNT as treatment for Obesity which is impacting his hypertension and hyperlipidemia.      Weight loss medication:  Topamax, Metformin  .   Pt's weight is 276 lbs  Initial weight: 271 lbs  Weight change: no change int he past 2 months         8/14/2024     1:09 PM   Changes and Difficulties   I have made the following changes to my diet since my last visit: None   With regards to my diet, I am still struggling with: Trying to eat more veggies, trying to eat more dairy   I have made the following changes to my activity/exercise since my last visit: Working out- two 30 min sessions per day biking, weights   With regards to my activity/exercise, I am still struggling with: None     BMI: 38.08  Ideal body weight: 75.3 kg (166 lb 0.1 oz)  Adjusted ideal body weight: 93.6 kg (206 lb 6.5 oz)    Estimated RMR (Omar-St Jeor equation):   1,830 kcals x 1.2 (sedentary) = 2,200 kcals (for weight maintenance)  Recommended Protein Intake: 70-80 grams of protein/day  Patient Active Problem List:  Patient Active Problem List   Diagnosis    Benign hypertension    Chronic obstructive pulmonary disease (H)    Frequent falls    History of subdural hematoma (post traumatic)    Hyperlipidemia    Insomnia    Lower urinary tract symptoms due to benign prostatic hyperplasia    Nocturia    Obesity    Seasonal allergies    Sepsis (H)    Chronic kidney disease, stage 3a (H)    Morbid obesity (H)    Benign neoplasm of descending colon     Diabetes: Yes, type 2   HbA1c: 5.8%  (9/09/2024)    Progress on goals from  last visit: Patient continues to work on using the plate method in regards to carbohydrates. Patient is working on portion control and eating smaller servings. Noted he is drinking more 1% milk d/t having low blood sugars for the last week- having them happen in the middle of the night. Eating 1 fruit or vegetables daily     Continue reading nutriiton lables and practicing portion sizes  Follow myplate plate balance  Keep carb servings to 25% of meal       Dietary Recall:  Breakfast: x3 eggs with turkey patties and breakfast potatoes  OR cold cereal Or pancakes, eggs and sausage   Lunch: potatoes, with peas or green beans Or frozen meal Or noodles, pot pie   Dinner: breaded chicken with peas and carrots with homemade bread   Typical snacks: crackers, cheese popcorn, cottage cheese   Beverages:   Diet sprite soda,   1% milk   Water x3 16 oz glasses,   Exercise: exercise bike 8 min, weights, and PT workouts for 30 min 2x per day      Nutrition Diagnosis:    Obesity related to excessive energy intake as evidence by high carb based diet, large portions and BMI of 38.08 kg/m2          Intervention:  Food and/or nutrient delivery:   Consistency with meals, choosing sugar free beverages.   Controlled portion sizes.    Limiting carb intake to no more than 25% of plate  At least 4-5oz of meat protein with dinner and lunch   Increasing fruit and vegetables daily  Nutrition counseling: continued support and goal setting  Encouraged patient to continue with great workout habits.    Monitoring/Evaluation:    Goals:  Continue reading nutriiton lables and practicing portion sizes  Increase fruit or vegetable servings to target at least 2 servings today  Keep carb servings to 25% of meal   Try having greek yogurt serving with berries as a bedtime snack to avoid low blood sugars in the evening    Patient to follow up in 3 month(s) with bariatrician and 3 month(s) with RD    Handouts   Myplate method and diabetes management        Phone call duration: 16 minutes      Originating Location (pt. Location): Home      Distant Location (provider location):  On-site    Sena Hope RD

## 2025-02-27 NOTE — PROGRESS NOTES
Virtual Visit Details    Type of service:  Telephone Visit   Phone call duration: 12 minutes   Originating Location (pt. Location): Home    Distant Location (provider location):  On-site  Telephone visit completed due to the patient did not consent to a video visit.  Greater than 10 minutes spent in medical consultation with the patient.     [facility] Non Surgical Follow up Visit   Date of visit: 3/6/2025  Physician: DANE Arceo MD, MD  Primary Care is Antonio Lujan.  Derek Harrington   70 year old  male     Initial Weight: 270#  Initial BMI: 38.84  Today's Weight:   Wt Readings from Last 1 Encounters:   08/14/24 124.7 kg (275 lb)     There is no height or weight on file to calculate BMI.           Assessment and Plan   Assessment: Derek is a 70 year old year old male who presents for medical weight management.      Plan:    1. Morbid obesity (H) (Primary)  Patient was congratulated on his success thus far. Healthy habits to assist with further weight loss were discussed. He will continue to work on muscle building activity and getting adequate protein and vegetables. He will continue the mounjaro and topamax. We could consider weaning off of the topamax in the future. We discussed the patient's co-morbid conditions including DM and hypertension. These likely will improve with healthy habits and weight loss.     2. Type 2 diabetes mellitus with other specified complication, without long-term current use of insulin (H)  This may improve with healthy habits and weight loss. He will continue mounjaro.    3. Benign hypertension  This may improve with healthy habits and weight loss.      Follow up next available with myself and the dietician           INTERIM HISTORY  Patient is taking topamax and mounjaro  (prescribed by his endocrinologist) for appetite and craving control and he thinks it is helping. He is losing weight. He notices early satiety. He denies side effects.      DIETARY  HISTORY  Meals Per Day: 3  Eating Protein First?: usually  Food Diary: B:cereal or eggs or pancakes L: yogurt, vegetables D:meat and starch and vegetable or tacos  Snacks Per Day: decreased  Typical Snack: yogurt  Fluid Intake: 3 16 oz glasses  Portion Control: improved  Calorie Containing Beverages: none    Positive Changes Since Last Visit: smaller portions, less snacking  Struggling With: activity is limited    Knowledgeable in Reading Food Labels: working on it  Getting Adequate Protein: working on it    PHYSICAL ACTIVITY PATTERNS:  Arm exercises with dumbbells most days of the week    REVIEW OF SYSTEMS  GI:  Pancreatitis: no  PSYCHIATRIC:  Moods: stable  MUSCULOSKELETAL/RHEUMATOLOGIC  Arthralgias: yes  Myalgias: yes  ENDOCRINE:  No personal or family history of medullary thyroid cancer no  :  Birth control: male  History of kidney stones: Not discussed      Patient Profile   Social History     Social History Narrative    Not on file        Past Medical History   Past Medical History:   Diagnosis Date    History of diabetes mellitus     pre-diabetic    Hypertension      Patient Active Problem List   Diagnosis    Benign hypertension    Chronic obstructive pulmonary disease (H)    Frequent falls    History of subdural hematoma (post traumatic)    Hyperlipidemia    Insomnia    Lower urinary tract symptoms due to benign prostatic hyperplasia    Nocturia    Obesity    Seasonal allergies    Sepsis (H)    Chronic kidney disease, stage 3a (H)    Morbid obesity (H)    Benign neoplasm of descending colon       Past Surgical History  He has no past surgical history on file.     Examination   There were no vitals taken for this visit.    Wt Readings from Last 4 Encounters:   03/06/25 125.2 kg (276 lb)   08/14/24 124.7 kg (275 lb)   07/17/24 123.8 kg (273 lb)   04/24/24 124.7 kg (275 lb)      General:  Alert and ambulatory, NAD       Counseling:   We reviewed the important post op bariatric recommendations:  -eating 3  meals daily  -eating protein first, getting >60gm protein daily  -eating slowly, chewing food well  -avoiding/limiting calorie containing beverages  -limiting starchy vegetables and carbohydrates, choosing wheat, not white with breads,   crackers, pastas, óscar, bagels, tortillas, rice  -limiting restaurant or cafeteria eating to twice a week or less    We discussed the importance of restorative sleep and stress management in maintaining a healthy weight.  We discussed the National Weight Control Registry healthy weight maintenance strategies and ways to optimize metabolism.  We discussed the importance of physical activity including cardiovascular and strength training in maintaining a healthier weight.    Total time spent on the date of this encounter doing: chart review, review of test results, patient visit, physical exam, education, counseling, developing plan of care and documenting = 32 minutes.         DANE Arceo MD  [facility]

## 2025-03-06 ENCOUNTER — VIRTUAL VISIT (OUTPATIENT)
Dept: SURGERY | Facility: CLINIC | Age: 70
End: 2025-03-06
Payer: COMMERCIAL

## 2025-03-06 VITALS — WEIGHT: 276 LBS | HEIGHT: 71 IN | BODY MASS INDEX: 38.64 KG/M2

## 2025-03-06 DIAGNOSIS — E66.01 MORBID OBESITY (H): Primary | ICD-10-CM

## 2025-03-06 DIAGNOSIS — E11.69 TYPE 2 DIABETES MELLITUS WITH OTHER SPECIFIED COMPLICATION, WITHOUT LONG-TERM CURRENT USE OF INSULIN (H): ICD-10-CM

## 2025-03-06 DIAGNOSIS — I10 BENIGN HYPERTENSION: ICD-10-CM

## 2025-03-06 RX ORDER — TIRZEPATIDE 2.5 MG/.5ML
INJECTION, SOLUTION SUBCUTANEOUS
COMMUNITY
Start: 2024-12-30 | End: 2025-03-06

## 2025-03-06 ASSESSMENT — PAIN SCALES - GENERAL: PAINLEVEL_OUTOF10: NO PAIN (0)

## 2025-03-06 NOTE — NURSING NOTE
Current patient location: [address] [place] [address]    Is the patient currently in the state of [place]? YES    Visit mode: TELEPHONE    If the visit is dropped, the patient can be reconnected by:TELEPHONE VISIT: Phone number: [phone]    Will anyone else be joining the visit? NO  (If patient encounters technical issues they should call [phone] :[phone])    Are changes needed to the allergy or medication list? No    Are refills needed on medications prescribed by this physician? NO    Rooming Documentation:  Questionnaire(s) completed    Reason for visit: RECHECK    Dona Hagen F

## 2025-03-06 NOTE — LETTER
3/6/2025      Derek Harrington  [address] [place] [address]      Dear Colleague,    Thank you for referring your patient, Derek Harrington, to the [facility] [place]. Please see a copy of my visit note below.    Virtual Visit Details    Type of service:  Telephone Visit   Phone call duration: 12 minutes   Originating Location (pt. Location): Home    Distant Location (provider location):  On-site  Telephone visit completed due to the patient did not consent to a video visit.  Greater than 10 minutes spent in medical consultation with the patient.     Bariat[facility] Care Clinic Non Surgical Follow up Visit   Date of visit: 3/6/2025  Physician: DANE Arceo MD, MD  Primary Care is Antonio Lujan.  Derek Harrington   70 year old  male     Initial Weight: 270#  Initial BMI: 38.84  Today's Weight:   Wt Readings from Last 1 Encounters:   08/14/24 124.7 kg (275 lb)     There is no height or weight on file to calculate BMI.           Assessment and Plan   Assessment: Derek is a 70 year old year old male who presents for medical weight management.      Plan:    1. Morbid obesity (H) (Primary)  Patient was congratulated on his success thus far. Healthy habits to assist with further weight loss were discussed. He will continue to work on muscle building activity and getting adequate protein and vegetables. He will continue the mounjaro and topamax. We could consider weaning off of the topamax in the future. We discussed the patient's co-morbid conditions including DM and hypertension. These likely will improve with healthy habits and weight loss.     2. Type 2 diabetes mellitus with other specified complication, without long-term current use of insulin (H)  This may improve with healthy habits and weight loss. He will continue mounjaro.    3. Benign hypertension  This may improve with healthy habits and weight loss.      Follow up next available with myself and the  dietician           INTERIM HISTORY  Patient is taking topamax and mounjaro  (prescribed by his endocrinologist) for appetite and craving control and he thinks it is helping. He is losing weight. He notices early satiety. He denies side effects.      DIETARY HISTORY  Meals Per Day: 3  Eating Protein First?: usually  Food Diary: B:cereal or eggs or pancakes L: yogurt, vegetables D:meat and starch and vegetable or tacos  Snacks Per Day: decreased  Typical Snack: yogurt  Fluid Intake: 3 16 oz glasses  Portion Control: improved  Calorie Containing Beverages: none    Positive Changes Since Last Visit: smaller portions, less snacking  Struggling With: activity is limited    Knowledgeable in Reading Food Labels: working on it  Getting Adequate Protein: working on it    PHYSICAL ACTIVITY PATTERNS:  Arm exercises with dumbbells most days of the week    REVIEW OF SYSTEMS  GI:  Pancreatitis: no  PSYCHIATRIC:  Moods: stable  MUSCULOSKELETAL/RHEUMATOLOGIC  Arthralgias: yes  Myalgias: yes  ENDOCRINE:  No personal or family history of medullary thyroid cancer no  :  Birth control: male  History of kidney stones: Not discussed      Patient Profile   Social History     Social History Narrative     Not on file        Past Medical History   Past Medical History:   Diagnosis Date     History of diabetes mellitus     pre-diabetic     Hypertension      Patient Active Problem List   Diagnosis     Benign hypertension     Chronic obstructive pulmonary disease (H)     Frequent falls     History of subdural hematoma (post traumatic)     Hyperlipidemia     Insomnia     Lower urinary tract symptoms due to benign prostatic hyperplasia     Nocturia     Obesity     Seasonal allergies     Sepsis (H)     Chronic kidney disease, stage 3a (H)     Morbid obesity (H)     Benign neoplasm of descending colon       Past Surgical History  He has no past surgical history on file.     Examination   There were no vitals taken for this visit.    Wt Readings  from Last 4 Encounters:   03/06/25 125.2 kg (276 lb)   08/14/24 124.7 kg (275 lb)   07/17/24 123.8 kg (273 lb)   04/24/24 124.7 kg (275 lb)      General:  Alert and ambulatory, NAD       Counseling:   We reviewed the important post op bariatric recommendations:  -eating 3 meals daily  -eating protein first, getting >60gm protein daily  -eating slowly, chewing food well  -avoiding/limiting calorie containing beverages  -limiting starchy vegetables and carbohydrates, choosing wheat, not white with breads,   crackers, pastas, óscar, bagels, tortillas, rice  -limiting restaurant or cafeteria eating to twice a week or less    We discussed the importance of restorative sleep and stress management in maintaining a healthy weight.  We discussed the National Weight Control Registry healthy weight maintenance strategies and ways to optimize metabolism.  We discussed the importance of physical activity including cardiovascular and strength training in maintaining a healthier weight.    Total time spent on the date of this encounter doing: chart review, review of test results, patient visit, physical exam, education, counseling, developing plan of care and documenting = 32 minutes.         DANE Arceo MD  [facility]               Again, thank you for allowing me to participate in the care of your patient.        Sincerely,        DANE Arceo MD    Electronically signed

## 2025-03-06 NOTE — PATIENT INSTRUCTIONS

## 2025-04-16 ENCOUNTER — ALLIED HEALTH/NURSE VISIT (OUTPATIENT)
Dept: SURGERY | Facility: CLINIC | Age: 70
End: 2025-04-16
Payer: COMMERCIAL

## 2025-04-16 VITALS — BODY MASS INDEX: 38.22 KG/M2 | WEIGHT: 270.2 LBS

## 2025-04-16 DIAGNOSIS — N18.31 CHRONIC KIDNEY DISEASE, STAGE 3A (H): ICD-10-CM

## 2025-04-16 DIAGNOSIS — E66.9 OBESITY (BMI 30-39.9): Primary | ICD-10-CM

## 2025-04-16 DIAGNOSIS — Z71.3 NUTRITIONAL COUNSELING: ICD-10-CM

## 2025-04-16 PROCEDURE — 97803 MED NUTRITION INDIV SUBSEQ: CPT | Mod: GZ

## 2025-04-16 NOTE — PROGRESS NOTES
Medical  Weight Loss Follow-Up Diet Evaluation  Assessment:  Derek is presenting today for a follow up weight management nutrition consultation.  This patient has had an initial appointment and was referred by Dr. Arceo for MNT as treatment for Obesity which is impacting his CKD3, pre diabetes, hypertension and hyperlipidemia.      Weight loss medication: other Mounjaro., Topamax, Metformin   Pt's weight is 270.2 lbs  Initial weight: 271 lbs  Weight change: maintenance         3/6/2025     1:40 PM   Changes and Difficulties   I have made the following changes to my diet since my last visit: tring to eat more vegitables   With regards to my diet, I am still struggling with: has a large appetite   I have made the following changes to my activity/exercise since my last visit: still working out   With regards to my activity/exercise, I am still struggling with: none     BMI: 38.22  Ideal body weight: 75.3 kg (166 lb 0.1 oz)  Adjusted ideal body weight: 93.6 kg (206 lb 6.5 oz)    Estimated RMR (Olds-St Jeor equation):   1,830 kcals x 1.2 (sedentary) = 2,200 kcals (for weight maintenance)  Recommended Protein Intake: 70-80 grams of protein/day  Patient Active Problem List:  Patient Active Problem List   Diagnosis    Benign hypertension    Chronic obstructive pulmonary disease (H)    Frequent falls    History of subdural hematoma (post traumatic)    Hyperlipidemia    Insomnia    Lower urinary tract symptoms due to benign prostatic hyperplasia    Nocturia    Obesity    Seasonal allergies    Sepsis (H)    Chronic kidney disease, stage 3a (H)    Morbid obesity (H)    Benign neoplasm of descending colon     Diabetes: Yes, type 2   HbA1c: 5.8%  (4/04/2025)    Progress on goals from last visit: Patient has started Mounjaro. Noted he has been taking it for 3 months. Weight is down, appetite is down. Reports no side effects, maybe occasional heart burn - likely d/t high fat intake.  - continues eating three meals per  day  - tends to snack in the evening time   - A1c is coming down     Continue reading nutrition lables and practicing portion sizes  Increase fruit or vegetable servings to target at least 2 servings today  Keep carb servings to 25% of meal - in progress  Try having greek yogurt serving with berries as a bedtime snack to avoid low blood sugars in the evening - has been doing this.      Dietary Recall:  Breakfast: x3 eggs with homemade pancakes x2 Or cereal with 1 or 2% Or hot cereal OR egg bake with added veggies and meat   Lunch: ramen noodles OR hot dogs   Dinner: breaded chicken with peas and carrots with homemade bread Or pasta Or tacos   Typical snacks: crackers, cheese popcorn, cottage cheese, bananas   Beverages:   Diet sprite soda,   1% milk   Water x3 16 oz glasses,   Exercise: exercise bike 6-8 min, weights when able     Nutrition Diagnosis:    Obesity related to excessive energy intake as evidence by high carb based diet, large portions and BMI of 38.22 kg/m2    Excessive carb intake related to nutrition knowledge deficit as evidence by hx of hbg A1c >6%          Intervention:  Food and/or nutrient delivery:   Consistency with meals, choosing sugar free beverages.   Controlled portion sizes.    Limiting carb intake to no more than 25% of plate  At least 4-5oz of meat protein with dinner and lunch   Increasing fruit and vegetables daily  Nutrition counseling: continued support and goal setting  Encouraged patient to continue with great workout habits.    Monitoring/Evaluation:    Goals:  Continue reading nutrition lables and practicing portion sizes  Increase fruit or vegetable servings to target at least 2 servings today  Keep carb servings to 25% of meal           Patient to follow up in 4 month(s) with bariatrician and rec 3 month(s) with RD          Time spend with patient: 30 minutes     Sena Hope RD

## 2025-04-16 NOTE — PATIENT INSTRUCTIONS
1,800-Calorie 5-Day Menus    A healthy meal plan includes a variety of foods from multiple food groups. This 5-day meal plan provides ideas for selecting foods for  breakfast, lunch, dinner, and snacks. You may use this meal plan as a starting point, and then tailor the foods and meals to your own  tastes.    This meal plan follows MyPlate, the dietary guidance published by the [place] Department of Agriculture. Your registered dietitian nutritionist  can provide you with information about MyPlate.     The menus in this handout provide about 1,800 calories per day and meet the MyPlate daily targets for most adults. The calorie level may  change if you adjust the foods/ingredients, portion sizes, or cooking methods. The following amounts total about 1,800 calories: 1  cups of fruits, 2   cups of vegetables, 6 ounces of grains, 5 ounces of protein, 3 cups of dairy    Day 1  Breakfast  1 egg, scrambled or fried in nonstick pan without oil or butter  1 slice whole wheat toast    avocado (mashed and spread on toast)  Morning Snack  1 cup low-fat yogurt, plain or vanilla  1 cup strawberries  Lunch  Taco made with:2 corn tortillas  2 ounces lean ground beef, cooked  1 teaspoon taco seasoning (for ground beef)  1 ounce low-fat shredded cheese  1 cup shredded lettuce    cup tomato, chopped  2 tablespoons salsa  1 medium orange  Afternoon Snack  6 whole wheat crackers  1  ounce low-fat cheddar cheese  Evening Meal  3 ounces tilapia, broiled  2 tablespoons tartar sauce  1 cup cooked brown rice  1 cup green beans, cooked  Evening Snack  1 cup, whole grain, ready-to-eat cereal  1 cup 1% milk  Calories: 1,782  Protein: 107 g  Fiber: 34 g    Day 2  Breakfast  Smoothie made with: 1 cup 1% milk  1 medium banana    cup frozen strawberries  1 slice whole wheat toast  1 tablespoon peanut butter  Lunch  Turkey sandwich made with: 2 slices whole grain bread  2 ounces turkey breast  2 teaspoons mayonnaise  2 slices tomato  2 leaves  lettuce  2 slices onion  2 slices pickle  Afternoon Snack   1 medium apple  Evening Meal  3 ounce lean hamburger christofer, cooked  1 whole wheat hamburger bun  1 slice low-fat cheddar cheese  1 teaspoon ketchup  Salad made with: 2 cups lettuce    cup cherry tomatoes    avocado  1 tablespoon olive oil and vinegar salad dressing  Evening Snack     cup sugar-free pudding made with low-fat milk  Calories: 1,721  Protein: 77 g  Fiber: 32 g    Day 3  Breakfast  1 cup cooked oatmeal    cup blueberries  1 tablespoon raisins  1 tablespoon walnuts, chopped  1 cup 1% milk  Morning Snack  1 tablespoon peanut butter  1 medium apple  Lunch  Tuna pocket sandwich made with: 2 ounces tuna, canned in water, drained  1 whole wheat óscar bread pocket  1 tablespoon mayonnaise  2 leaves lettuce    cup baby carrots  Afternoon Snack  1  ounces Swiss cheese  6 whole wheat crackers  Evening Meal  4 ounces chicken breast, grilled  1 medium sweet potato, baked and topped with:  1 tablespoon margarine, soft, tub    teaspoon cinnamon  1 cup spinach, sauteed  2 cups lettuce, for salad  1 tablespoon olive oil and vinegar salad dressing  Evening Snack   1 cup low-fat fruit, flavored yogurt, no sugar added  Calories: 1,797  Protein: 104 g  Fiber: 31 g    Day 4  Breakfast  Egg sandwich made with: 1 egg, scrambled  1 English muffin  1 ounce low-fat cheese  1 medium banana  Morning Snack     cup low-fat, fruit-flavored yogurt, no sugar added  Lunch  1 ounce peanuts, dry-roasted, without salt  2 pieces string cheese  6 whole wheat crackers  1 medium orange  Afternoon Snack    cup baby carrots    cup hummus  Evening Meal  1 single-serve frozen entree, lasagna with meat and sauce  1 cup green beans, cooked  Salad made with: 2 cups lettuce    cup cherry tomatoes    cup shredded carrots    cup cucumber slices  2 tablespoons olive oil and vinegar salad dressing  Evening Snack   1 cup 1% milk  Calories: 1,809  Protein: 91 g  Fiber: 31 g    Day  5  Breakfast  Breakfast burrito made with: 2 corn tortillas  2 eggs, scrambled    cup onion, chopped    cup green pepper, chopped  2 tablespoons salsa  1 cup 1% milk  Lunch  Stuffed baked potato: 1 large potato, baked    cup chopped broccoli, cooked  1 ounce low-fat cheese  1 medium orange  Afternoon Snack  3 cups popcorn, air popped  1 tablespoon margarine, soft, tub  Evening Meal  Vegetable stir-courtney: 4 ounces tofu  1 cup cooked brown rice    cup broccoli, chopped    cup green pepper, sliced    cup onions, chopped    cup mushrooms, sliced  1 tablespoon olive oil  1 tablespoon teriyaki sauce, low sodium  Evening Snack  1 cup low-fat yogurt, plain or vanilla    cup blueberries  Calories: 1,771  Protein: 78 g  Fiber: 33 g    Reference: Academy of Nutrition and Dietetics education resources

## 2025-05-06 ENCOUNTER — TELEPHONE (OUTPATIENT)
Dept: SURGERY | Facility: CLINIC | Age: 70
End: 2025-05-06
Payer: COMMERCIAL

## 2025-05-06 DIAGNOSIS — Z86.69 HISTORY OF MIGRAINE HEADACHES: ICD-10-CM

## 2025-05-06 RX ORDER — TOPIRAMATE 50 MG/1
100 TABLET, FILM COATED ORAL EVERY EVENING
Qty: 180 TABLET | Refills: 0 | Status: SHIPPED | OUTPATIENT
Start: 2025-05-06

## 2025-05-06 NOTE — TELEPHONE ENCOUNTER
Medication Question or Refill    Contacts       Contact Date/Time Type Contact Phone/Fax    05/06/2025 10:43 AM CDT Phone (Incoming) Derek Harrington [place] (Self) [phone] (M)            What medication are you calling about (include dose and sig)?: Toperimate 50mg    Preferred Pharmacy:   [id number] [address]  [address] [place] [id number]  Phone: [phone] Fax: [phone]      Controlled Substance Agreement on file:   CSA -- Patient Level:    CSA: None found at the patient level.       Who prescribed the medication?: Anais Arceo    Do you need a refill? Yes    When did you use the medication last? 04/22/2025 approximately    Patient offered an appointment? Yes: Pt. ,wants to look at future appointments after message is sent.    Do you have any questions or concerns?  Yes: Pt. Checked with his pharmacy a week ago and there were no more refills.      Okay to leave a detailed message?: Yes at Cell number on file:    Telephone Information:   Mobile [phone]

## 2025-05-16 ENCOUNTER — TRANSFERRED RECORDS (OUTPATIENT)
Dept: HEALTH INFORMATION MANAGEMENT | Facility: CLINIC | Age: 70
End: 2025-05-16

## 2025-07-24 ENCOUNTER — LAB REQUISITION (OUTPATIENT)
Dept: LAB | Facility: CLINIC | Age: 70
End: 2025-07-24
Payer: COMMERCIAL

## 2025-07-24 DIAGNOSIS — R11.2 NAUSEA WITH VOMITING, UNSPECIFIED: ICD-10-CM

## 2025-07-24 PROCEDURE — 80053 COMPREHEN METABOLIC PANEL: CPT | Mod: ORL | Performed by: FAMILY MEDICINE

## 2025-07-25 ENCOUNTER — LAB REQUISITION (OUTPATIENT)
Dept: LAB | Facility: CLINIC | Age: 70
End: 2025-07-25
Payer: COMMERCIAL

## 2025-07-25 DIAGNOSIS — R19.7 DIARRHEA, UNSPECIFIED: ICD-10-CM

## 2025-07-25 LAB
ALBUMIN SERPL BCG-MCNC: 4.2 G/DL (ref 3.5–5.2)
ALP SERPL-CCNC: 96 U/L (ref 40–150)
ALT SERPL W P-5'-P-CCNC: 42 U/L (ref 0–70)
ANION GAP SERPL CALCULATED.3IONS-SCNC: 15 MMOL/L (ref 7–15)
AST SERPL W P-5'-P-CCNC: 25 U/L (ref 0–45)
BILIRUB SERPL-MCNC: 0.6 MG/DL
BUN SERPL-MCNC: 14.8 MG/DL (ref 8–23)
C CAYETANENSIS DNA STL QL NAA+NON-PROBE: NEGATIVE
CALCIUM SERPL-MCNC: 10.1 MG/DL (ref 8.8–10.4)
CAMPYLOBACTER DNA SPEC NAA+PROBE: NEGATIVE
CHLORIDE SERPL-SCNC: 103 MMOL/L (ref 98–107)
CREAT SERPL-MCNC: 1.21 MG/DL (ref 0.67–1.17)
CRYPTOSP DNA STL QL NAA+NON-PROBE: NEGATIVE
EC STX1+STX2 GENES STL QL NAA+NON-PROBE: NEGATIVE
EGFRCR SERPLBLD CKD-EPI 2021: 65 ML/MIN/1.73M2
G LAMBLIA DNA STL QL NAA+NON-PROBE: NEGATIVE
GLUCOSE SERPL-MCNC: 102 MG/DL (ref 70–99)
HCO3 SERPL-SCNC: 21 MMOL/L (ref 22–29)
NOROVIRUS GI+II RNA STL QL NAA+NON-PROBE: NEGATIVE
POTASSIUM SERPL-SCNC: 4.4 MMOL/L (ref 3.4–5.3)
PROT SERPL-MCNC: 6.8 G/DL (ref 6.4–8.3)
SALMONELLA SP RPOD STL QL NAA+PROBE: NEGATIVE
SHIGELLA SP+EIEC IPAH ST NAA+NON-PROBE: NEGATIVE
SODIUM SERPL-SCNC: 139 MMOL/L (ref 135–145)
VIBRIO DNA SPEC NAA+PROBE: NEGATIVE
Y ENTEROCOL DNA STL QL NAA+PROBE: NEGATIVE

## 2025-07-25 PROCEDURE — 87506 IADNA-DNA/RNA PROBE TQ 6-11: CPT | Mod: ORL | Performed by: FAMILY MEDICINE

## 2025-08-07 ENCOUNTER — TELEPHONE (OUTPATIENT)
Dept: PHARMACY | Facility: OTHER | Age: 70
End: 2025-08-07
Payer: COMMERCIAL

## 2025-08-14 ENCOUNTER — OFFICE VISIT (OUTPATIENT)
Dept: SURGERY | Facility: CLINIC | Age: 70
End: 2025-08-14
Payer: COMMERCIAL

## 2025-08-14 VITALS
BODY MASS INDEX: 36.82 KG/M2 | HEIGHT: 71 IN | DIASTOLIC BLOOD PRESSURE: 78 MMHG | WEIGHT: 263 LBS | SYSTOLIC BLOOD PRESSURE: 118 MMHG

## 2025-08-14 DIAGNOSIS — E66.01 MORBID OBESITY (H): Primary | ICD-10-CM

## 2025-08-14 DIAGNOSIS — E11.69 TYPE 2 DIABETES MELLITUS WITH OTHER SPECIFIED COMPLICATION, WITHOUT LONG-TERM CURRENT USE OF INSULIN (H): ICD-10-CM

## 2025-08-19 ENCOUNTER — VIRTUAL VISIT (OUTPATIENT)
Dept: PHARMACY | Facility: PHYSICIAN GROUP | Age: 70
End: 2025-08-19
Payer: COMMERCIAL

## 2025-08-19 DIAGNOSIS — I10 BENIGN HYPERTENSION: ICD-10-CM

## 2025-08-19 DIAGNOSIS — I10 BENIGN ESSENTIAL HYPERTENSION: ICD-10-CM

## 2025-08-19 DIAGNOSIS — E66.01 MORBID OBESITY (H): ICD-10-CM

## 2025-08-19 DIAGNOSIS — Z79.4 TYPE 2 DIABETES MELLITUS WITHOUT COMPLICATION, WITH LONG-TERM CURRENT USE OF INSULIN (H): Primary | ICD-10-CM

## 2025-08-19 DIAGNOSIS — E03.9 HYPOTHYROIDISM, UNSPECIFIED TYPE: ICD-10-CM

## 2025-08-19 DIAGNOSIS — E78.5 HYPERLIPIDEMIA, UNSPECIFIED HYPERLIPIDEMIA TYPE: ICD-10-CM

## 2025-08-19 DIAGNOSIS — J44.9 CHRONIC OBSTRUCTIVE PULMONARY DISEASE, UNSPECIFIED COPD TYPE (H): ICD-10-CM

## 2025-08-19 DIAGNOSIS — G62.9 NEUROPATHY: ICD-10-CM

## 2025-08-19 DIAGNOSIS — N40.1 LOWER URINARY TRACT SYMPTOMS DUE TO BENIGN PROSTATIC HYPERPLASIA: ICD-10-CM

## 2025-08-19 DIAGNOSIS — G47.00 INSOMNIA, UNSPECIFIED TYPE: ICD-10-CM

## 2025-08-19 DIAGNOSIS — Z86.69 HISTORY OF MIGRAINE HEADACHES: ICD-10-CM

## 2025-08-19 DIAGNOSIS — Z78.9 TAKES DIETARY SUPPLEMENTS: ICD-10-CM

## 2025-08-19 DIAGNOSIS — E11.9 TYPE 2 DIABETES MELLITUS WITHOUT COMPLICATION, WITH LONG-TERM CURRENT USE OF INSULIN (H): Primary | ICD-10-CM

## 2025-08-19 PROCEDURE — 99607 MTMS BY PHARM ADDL 15 MIN: CPT | Mod: 93 | Performed by: PHARMACIST

## 2025-08-19 PROCEDURE — 99605 MTMS BY PHARM NP 15 MIN: CPT | Mod: 93 | Performed by: PHARMACIST

## 2025-08-20 ENCOUNTER — TRANSFERRED RECORDS (OUTPATIENT)
Dept: HEALTH INFORMATION MANAGEMENT | Facility: CLINIC | Age: 70
End: 2025-08-20
Payer: COMMERCIAL

## 2025-08-20 LAB — HBA1C MFR BLD: 5.4 % (ref 4.2–6.1)

## 2025-08-25 RX ORDER — ASPIRIN 81 MG/1
81 TABLET, CHEWABLE ORAL DAILY
COMMUNITY

## 2025-09-03 ENCOUNTER — ALLIED HEALTH/NURSE VISIT (OUTPATIENT)
Dept: SURGERY | Facility: CLINIC | Age: 70
End: 2025-09-03
Payer: COMMERCIAL

## 2025-09-03 VITALS — WEIGHT: 262.8 LBS | BODY MASS INDEX: 37.17 KG/M2

## 2025-09-03 DIAGNOSIS — E66.9 OBESITY (BMI 30-39.9): Primary | ICD-10-CM

## 2025-09-03 DIAGNOSIS — E11.69 TYPE 2 DIABETES MELLITUS WITH OTHER SPECIFIED COMPLICATION, WITHOUT LONG-TERM CURRENT USE OF INSULIN (H): ICD-10-CM

## 2025-09-03 PROCEDURE — 97803 MED NUTRITION INDIV SUBSEQ: CPT | Mod: GZ
